# Patient Record
Sex: FEMALE | Race: WHITE | Employment: UNEMPLOYED | URBAN - METROPOLITAN AREA
[De-identification: names, ages, dates, MRNs, and addresses within clinical notes are randomized per-mention and may not be internally consistent; named-entity substitution may affect disease eponyms.]

---

## 2017-10-03 ENCOUNTER — HOSPITAL ENCOUNTER (OUTPATIENT)
Dept: WOMENS IMAGING | Age: 82
Discharge: HOME | End: 2017-10-03
Attending: INTERNAL MEDICINE | Admitting: INTERNAL MEDICINE

## 2017-10-03 DIAGNOSIS — Z12.31 VISIT FOR SCREENING MAMMOGRAM: ICD-10-CM

## 2017-12-14 PROBLEM — I50.43 CHF (CONGESTIVE HEART FAILURE), NYHA CLASS I, ACUTE ON CHRONIC, COMBINED (HCC): Status: ACTIVE | Noted: 2017-12-14

## 2018-06-03 PROBLEM — N39.0 UTI (URINARY TRACT INFECTION): Status: ACTIVE | Noted: 2018-06-03

## 2018-06-04 PROBLEM — F29 PSYCHOSIS (HCC): Status: ACTIVE | Noted: 2018-06-04

## 2018-07-04 PROBLEM — N39.0 UTI (URINARY TRACT INFECTION): Status: RESOLVED | Noted: 2018-06-03 | Resolved: 2018-07-04

## 2018-08-19 PROBLEM — N39.0 URINARY TRACT INFECTION: Status: ACTIVE | Noted: 2018-08-19

## 2018-09-18 PROBLEM — N39.0 URINARY TRACT INFECTION: Status: RESOLVED | Noted: 2018-08-19 | Resolved: 2018-09-18

## 2018-12-12 ENCOUNTER — HOSPITAL ENCOUNTER (INPATIENT)
Age: 83
LOS: 2 days | Discharge: HOME | DRG: 689 | End: 2018-12-14
Attending: EMERGENCY MEDICINE | Admitting: INTERNAL MEDICINE
Payer: MEDICARE

## 2018-12-12 ENCOUNTER — APPOINTMENT (OUTPATIENT)
Dept: GENERAL RADIOLOGY | Age: 83
DRG: 689 | End: 2018-12-12
Payer: MEDICARE

## 2018-12-12 ENCOUNTER — APPOINTMENT (OUTPATIENT)
Dept: CT IMAGING | Age: 83
DRG: 689 | End: 2018-12-12
Payer: MEDICARE

## 2018-12-12 DIAGNOSIS — S00.93XA CONTUSION OF HEAD, UNSPECIFIED PART OF HEAD, INITIAL ENCOUNTER: ICD-10-CM

## 2018-12-12 DIAGNOSIS — S80.02XA CONTUSION OF LEFT KNEE, INITIAL ENCOUNTER: Primary | ICD-10-CM

## 2018-12-12 DIAGNOSIS — N30.00 ACUTE CYSTITIS WITHOUT HEMATURIA: ICD-10-CM

## 2018-12-12 PROBLEM — R53.1 GENERALIZED WEAKNESS: Status: ACTIVE | Noted: 2018-12-12

## 2018-12-12 PROBLEM — N30.01 ACUTE CYSTITIS WITH HEMATURIA: Status: ACTIVE | Noted: 2018-12-12

## 2018-12-12 PROBLEM — G93.41 ACUTE METABOLIC ENCEPHALOPATHY: Status: ACTIVE | Noted: 2018-12-12

## 2018-12-12 LAB
A/G RATIO: 0.9 (ref 1.1–2.2)
ALBUMIN SERPL-MCNC: 3.9 G/DL (ref 3.4–5)
ALP BLD-CCNC: 99 U/L (ref 40–129)
ALT SERPL-CCNC: <5 U/L (ref 10–40)
ANION GAP SERPL CALCULATED.3IONS-SCNC: 14 MMOL/L (ref 3–16)
AST SERPL-CCNC: 10 U/L (ref 15–37)
BACTERIA: ABNORMAL /HPF
BASOPHILS ABSOLUTE: 0 K/UL (ref 0–0.2)
BASOPHILS RELATIVE PERCENT: 0.3 %
BILIRUB SERPL-MCNC: 0.3 MG/DL (ref 0–1)
BILIRUBIN URINE: NEGATIVE
BLOOD, URINE: ABNORMAL
BUN BLDV-MCNC: 22 MG/DL (ref 7–20)
CALCIUM SERPL-MCNC: 9.1 MG/DL (ref 8.3–10.6)
CHLORIDE BLD-SCNC: 99 MMOL/L (ref 99–110)
CLARITY: ABNORMAL
CO2: 22 MMOL/L (ref 21–32)
COLOR: YELLOW
CREAT SERPL-MCNC: 0.7 MG/DL (ref 0.6–1.2)
EOSINOPHILS ABSOLUTE: 0 K/UL (ref 0–0.6)
EOSINOPHILS RELATIVE PERCENT: 0 %
EPITHELIAL CELLS, UA: 0 /HPF (ref 0–5)
GFR AFRICAN AMERICAN: >60
GFR NON-AFRICAN AMERICAN: >60
GLOBULIN: 4.5 G/DL
GLUCOSE BLD-MCNC: 127 MG/DL (ref 70–99)
GLUCOSE URINE: NEGATIVE MG/DL
HCT VFR BLD CALC: 30.9 % (ref 36–48)
HEMOGLOBIN: 9.9 G/DL (ref 12–16)
HYALINE CASTS: 2 /LPF (ref 0–8)
KETONES, URINE: NEGATIVE MG/DL
LEUKOCYTE ESTERASE, URINE: ABNORMAL
LYMPHOCYTES ABSOLUTE: 1 K/UL (ref 1–5.1)
LYMPHOCYTES RELATIVE PERCENT: 12.7 %
MCH RBC QN AUTO: 24.9 PG (ref 26–34)
MCHC RBC AUTO-ENTMCNC: 32.1 G/DL (ref 31–36)
MCV RBC AUTO: 77.5 FL (ref 80–100)
MICROSCOPIC EXAMINATION: YES
MONOCYTES ABSOLUTE: 1 K/UL (ref 0–1.3)
MONOCYTES RELATIVE PERCENT: 12.7 %
NEUTROPHILS ABSOLUTE: 5.9 K/UL (ref 1.7–7.7)
NEUTROPHILS RELATIVE PERCENT: 74.3 %
NITRITE, URINE: POSITIVE
PDW BLD-RTO: 15.6 % (ref 12.4–15.4)
PH UA: 5
PLATELET # BLD: 333 K/UL (ref 135–450)
PMV BLD AUTO: 6.7 FL (ref 5–10.5)
POTASSIUM REFLEX MAGNESIUM: 4.3 MMOL/L (ref 3.5–5.1)
PROTEIN UA: NEGATIVE MG/DL
RBC # BLD: 3.99 M/UL (ref 4–5.2)
RBC UA: 1 /HPF (ref 0–4)
SODIUM BLD-SCNC: 135 MMOL/L (ref 136–145)
SPECIFIC GRAVITY UA: 1.01
TOTAL PROTEIN: 8.4 G/DL (ref 6.4–8.2)
TROPONIN: <0.01 NG/ML
URINE REFLEX TO CULTURE: YES
URINE TYPE: ABNORMAL
UROBILINOGEN, URINE: 0.2 E.U./DL
WBC # BLD: 8 K/UL (ref 4–11)
WBC UA: 78 /HPF (ref 0–5)

## 2018-12-12 PROCEDURE — 84484 ASSAY OF TROPONIN QUANT: CPT

## 2018-12-12 PROCEDURE — 93010 ELECTROCARDIOGRAM REPORT: CPT | Performed by: INTERNAL MEDICINE

## 2018-12-12 PROCEDURE — 2580000003 HC RX 258: Performed by: EMERGENCY MEDICINE

## 2018-12-12 PROCEDURE — 6360000002 HC RX W HCPCS: Performed by: EMERGENCY MEDICINE

## 2018-12-12 PROCEDURE — 87186 SC STD MICRODIL/AGAR DIL: CPT

## 2018-12-12 PROCEDURE — 6370000000 HC RX 637 (ALT 250 FOR IP): Performed by: INTERNAL MEDICINE

## 2018-12-12 PROCEDURE — 99285 EMERGENCY DEPT VISIT HI MDM: CPT

## 2018-12-12 PROCEDURE — 72170 X-RAY EXAM OF PELVIS: CPT

## 2018-12-12 PROCEDURE — 70450 CT HEAD/BRAIN W/O DYE: CPT

## 2018-12-12 PROCEDURE — 1200000000 HC SEMI PRIVATE

## 2018-12-12 PROCEDURE — 85025 COMPLETE CBC W/AUTO DIFF WBC: CPT

## 2018-12-12 PROCEDURE — 73560 X-RAY EXAM OF KNEE 1 OR 2: CPT

## 2018-12-12 PROCEDURE — 80053 COMPREHEN METABOLIC PANEL: CPT

## 2018-12-12 PROCEDURE — 81001 URINALYSIS AUTO W/SCOPE: CPT

## 2018-12-12 PROCEDURE — 87086 URINE CULTURE/COLONY COUNT: CPT

## 2018-12-12 PROCEDURE — 96365 THER/PROPH/DIAG IV INF INIT: CPT

## 2018-12-12 PROCEDURE — 87077 CULTURE AEROBIC IDENTIFY: CPT

## 2018-12-12 PROCEDURE — 93005 ELECTROCARDIOGRAM TRACING: CPT | Performed by: EMERGENCY MEDICINE

## 2018-12-12 PROCEDURE — 71045 X-RAY EXAM CHEST 1 VIEW: CPT

## 2018-12-12 RX ORDER — SODIUM CHLORIDE 0.9 % (FLUSH) 0.9 %
10 SYRINGE (ML) INJECTION PRN
Status: DISCONTINUED | OUTPATIENT
Start: 2018-12-12 | End: 2018-12-14 | Stop reason: HOSPADM

## 2018-12-12 RX ORDER — DOCUSATE SODIUM 100 MG/1
100 CAPSULE, LIQUID FILLED ORAL 2 TIMES DAILY PRN
Status: DISCONTINUED | OUTPATIENT
Start: 2018-12-12 | End: 2018-12-14 | Stop reason: HOSPADM

## 2018-12-12 RX ORDER — CITALOPRAM 10 MG/1
10 TABLET ORAL DAILY
Status: DISCONTINUED | OUTPATIENT
Start: 2018-12-12 | End: 2018-12-14 | Stop reason: HOSPADM

## 2018-12-12 RX ORDER — ASPIRIN 81 MG/1
81 TABLET, CHEWABLE ORAL DAILY
Status: DISCONTINUED | OUTPATIENT
Start: 2018-12-12 | End: 2018-12-14 | Stop reason: HOSPADM

## 2018-12-12 RX ORDER — FAMOTIDINE 20 MG/1
20 TABLET, FILM COATED ORAL 2 TIMES DAILY
Status: DISCONTINUED | OUTPATIENT
Start: 2018-12-12 | End: 2018-12-14 | Stop reason: HOSPADM

## 2018-12-12 RX ORDER — SODIUM CHLORIDE 0.9 % (FLUSH) 0.9 %
10 SYRINGE (ML) INJECTION EVERY 12 HOURS SCHEDULED
Status: DISCONTINUED | OUTPATIENT
Start: 2018-12-12 | End: 2018-12-14 | Stop reason: HOSPADM

## 2018-12-12 RX ORDER — ONDANSETRON 2 MG/ML
4 INJECTION INTRAMUSCULAR; INTRAVENOUS EVERY 4 HOURS PRN
Status: DISCONTINUED | OUTPATIENT
Start: 2018-12-12 | End: 2018-12-14 | Stop reason: HOSPADM

## 2018-12-12 RX ORDER — ACETAMINOPHEN 325 MG/1
650 TABLET ORAL EVERY 6 HOURS PRN
Status: DISCONTINUED | OUTPATIENT
Start: 2018-12-12 | End: 2018-12-14 | Stop reason: HOSPADM

## 2018-12-12 RX ORDER — BISACODYL 10 MG
10 SUPPOSITORY, RECTAL RECTAL DAILY PRN
Status: DISCONTINUED | OUTPATIENT
Start: 2018-12-12 | End: 2018-12-14 | Stop reason: HOSPADM

## 2018-12-12 RX ORDER — CARVEDILOL 12.5 MG/1
12.5 TABLET ORAL 2 TIMES DAILY WITH MEALS
Status: DISCONTINUED | OUTPATIENT
Start: 2018-12-12 | End: 2018-12-14 | Stop reason: HOSPADM

## 2018-12-12 RX ADMIN — CARVEDILOL 12.5 MG: 12.5 TABLET, FILM COATED ORAL at 21:06

## 2018-12-12 RX ADMIN — FAMOTIDINE 20 MG: 20 TABLET ORAL at 21:06

## 2018-12-12 RX ADMIN — ACETAMINOPHEN 650 MG: 325 TABLET, FILM COATED ORAL at 21:06

## 2018-12-12 RX ADMIN — CEFTRIAXONE 1 G: 1 INJECTION, POWDER, FOR SOLUTION INTRAMUSCULAR; INTRAVENOUS at 18:46

## 2018-12-12 ASSESSMENT — PAIN SCALES - GENERAL: PAINLEVEL_OUTOF10: 4

## 2018-12-12 NOTE — ED NOTES
Went into check on pt. Pt had removed all monitor leads and IV> pressure held to IV site and bandage applied.       Daljit Krueger RN  12/12/18 6931

## 2018-12-12 NOTE — H&P
No radiographic evidence of acute fracture. Ct Head Wo Contrast    Result Date: 12/12/2018  EXAMINATION: CT OF THE HEAD WITHOUT CONTRAST  12/12/2018 5:42 pm TECHNIQUE: CT of the head was performed without the administration of intravenous contrast. Dose modulation, iterative reconstruction, and/or weight based adjustment of the mA/kV was utilized to reduce the radiation dose to as low as reasonably achievable. COMPARISON: CT 06/03/2018 HISTORY: ORDERING SYSTEM PROVIDED HISTORY: fall TECHNOLOGIST PROVIDED HISTORY: Has a \"code stroke\" or \"stroke alert\" been called? ->No Ordering Physician Provided Reason for Exam: fall, ams. pt cannot cooperate Acuity: Acute Type of Exam: Initial FINDINGS: BRAIN/VENTRICLES: There is no acute intracranial hemorrhage, mass effect or midline shift. No abnormal extra-axial fluid collection. The gray-white differentiation is maintained without evidence of an acute infarct. There is no evidence of hydrocephalus. Moderate periventricular white matter hypoattenuation, likely small vessel ischemic disease. Mild atrophic changes. ORBITS: The visualized portion of the orbits demonstrate no acute abnormality. SINUSES: The visualized paranasal sinuses and mastoid air cells demonstrate no acute abnormality. SOFT TISSUES/SKULL:  No acute abnormality of the visualized skull or soft tissues. No acute intracranial abnormality. Xr Chest Portable    Result Date: 12/12/2018  EXAMINATION: SINGLE XRAY VIEW OF THE CHEST 12/12/2018 3:11 pm COMPARISON: Chest 08/19/2018 HISTORY: ORDERING SYSTEM PROVIDED HISTORY: Fall TECHNOLOGIST PROVIDED HISTORY: Reason for exam:->Fall Ordering Physician Provided Reason for Exam: pain left leg Acuity: Chronic Type of Exam: Ongoing FINDINGS: The cardiac silhouette is enlarged. Calcifications involving the aorta reflect atherosclerosis. The mediastinal and hilar silhouettes appear unremarkable. Senescent pulmonary changes. No consolidation or pleural effusion. Right lung apex is obscured by the patient's overlying chin. No pneumothorax is seen. No acute osseous abnormality is identified. Unchanged pacemaker. 1. Senescent pulmonary changes. 2. Calcific atherosclerosis aorta. 3. Cardiomegaly. If clinically there is concern of underlying rib fracture, sternal or other acute traumatic abnormality of the chest, then additional evaluation with dedicated imaging of the area of interest versus CT chest should be considered. EKG:   Read by ER in the absence of a Cardiologist shows      Assessment:   Principal Problem:    Acute cystitis with hematuria  Active Problems:    Essential hypertension    Dementia    Generalized weakness    Acute metabolic encephalopathy  Resolved Problems:    * No resolved hospital problems. *      Plan:       Acute cystitis with hematuria - patient was started on Rocephin empirically. Urine culture and sensitivities have been ordered, and antibiotic therapy will be adjusted as necessary based upon those results. Acute metabolic encephalopathy in the setting of Dementia - Likley due to UTI. Pt is on a number of medications which can cause or worsen confusion. Will hold those for now    Generalized weakness - Will ask PT/OT to evaluate and treat patient, and if necessary to provide recommendations for post hospital therapy    Essential (primary) hypertension - not on meds; monitor blood pressure. DVT Prophylaxis: Lovenox   Diet: General  Code Status: Full  (Advanced care planning has been discussed with patient and/or responsible family member and is reflected in the code status. Further orders associated with this have been entered if appropriate)    Disposition: Anticipate that patient will remain in the hospital for 2 to 3 days depending on further evaluation and clinical course.      Lo Boyer MD

## 2018-12-12 NOTE — ED PROVIDER NOTES
[facility]  eMERGENCY dEPARTMENT eNCOUnter        Pt Name: Maximino Nyhan  MRN: [id number]  Jerilyngfabigail 1935  Date of evaluation: 12/12/2018  Provider: David Cedeño MD  PCP: Angie Sanchez MD      [address] [place] [address]       Chief Complaint   Patient presents with    Fall       HISTORY [place]   (Location/Symptom, Timing/Onset, Context/Setting, Quality, Duration, Modifying Factors,Severity)  Note limiting factors. Maximino Nyhan is a 80 y.o. female       Location/Symptom: Fall  Timing/Onset: Patient does come to us by ambulance from home. Apparently fell last night and again this morning. This morning he was in the shower. Context/Setting: Patient has no particular complaints but she does have some underlying dementia. Quality: She is declining any pain or any symptoms  Duration: As above this appeared to start last night  Modifying Factors: None known  Severity: 0    Nursing Noteswere all reviewed and agreed with or any disagreements were addressed  in the HPI.     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

## 2018-12-13 PROBLEM — E43 SEVERE MALNUTRITION (HCC): Chronic | Status: ACTIVE | Noted: 2018-12-13

## 2018-12-13 LAB
ANION GAP SERPL CALCULATED.3IONS-SCNC: 16 MMOL/L (ref 3–16)
BASOPHILS ABSOLUTE: 0.1 K/UL (ref 0–0.2)
BASOPHILS RELATIVE PERCENT: 0.8 %
BUN BLDV-MCNC: 19 MG/DL (ref 7–20)
CALCIUM SERPL-MCNC: 8.6 MG/DL (ref 8.3–10.6)
CHLORIDE BLD-SCNC: 99 MMOL/L (ref 99–110)
CO2: 15 MMOL/L (ref 21–32)
CREAT SERPL-MCNC: 0.7 MG/DL (ref 0.6–1.2)
EKG ATRIAL RATE: 76 BPM
EKG DIAGNOSIS: NORMAL
EKG P AXIS: 72 DEGREES
EKG P-R INTERVAL: 152 MS
EKG Q-T INTERVAL: 388 MS
EKG QRS DURATION: 130 MS
EKG QTC CALCULATION (BAZETT): 436 MS
EKG R AXIS: -55 DEGREES
EKG T AXIS: 115 DEGREES
EKG VENTRICULAR RATE: 76 BPM
EOSINOPHILS ABSOLUTE: 0 K/UL (ref 0–0.6)
EOSINOPHILS RELATIVE PERCENT: 0.4 %
GFR AFRICAN AMERICAN: >60
GFR NON-AFRICAN AMERICAN: >60
GLUCOSE BLD-MCNC: 100 MG/DL (ref 70–99)
HCT VFR BLD CALC: 29.1 % (ref 36–48)
HEMOGLOBIN: 9.4 G/DL (ref 12–16)
LYMPHOCYTES ABSOLUTE: 1.1 K/UL (ref 1–5.1)
LYMPHOCYTES RELATIVE PERCENT: 17.1 %
MCH RBC QN AUTO: 25.2 PG (ref 26–34)
MCHC RBC AUTO-ENTMCNC: 32.4 G/DL (ref 31–36)
MCV RBC AUTO: 77.8 FL (ref 80–100)
MONOCYTES ABSOLUTE: 0.7 K/UL (ref 0–1.3)
MONOCYTES RELATIVE PERCENT: 11.9 %
NEUTROPHILS ABSOLUTE: 4.4 K/UL (ref 1.7–7.7)
NEUTROPHILS RELATIVE PERCENT: 69.8 %
PDW BLD-RTO: 15.7 % (ref 12.4–15.4)
PLATELET # BLD: 317 K/UL (ref 135–450)
PMV BLD AUTO: 6.7 FL (ref 5–10.5)
POTASSIUM REFLEX MAGNESIUM: 4.4 MMOL/L (ref 3.5–5.1)
RBC # BLD: 3.73 M/UL (ref 4–5.2)
REASON FOR REJECTION: NORMAL
REJECTED TEST: NORMAL
SODIUM BLD-SCNC: 130 MMOL/L (ref 136–145)
WBC # BLD: 6.3 K/UL (ref 4–11)

## 2018-12-13 PROCEDURE — G8978 MOBILITY CURRENT STATUS: HCPCS

## 2018-12-13 PROCEDURE — 6370000000 HC RX 637 (ALT 250 FOR IP): Performed by: INTERNAL MEDICINE

## 2018-12-13 PROCEDURE — 2580000003 HC RX 258: Performed by: INTERNAL MEDICINE

## 2018-12-13 PROCEDURE — 97163 PT EVAL HIGH COMPLEX 45 MIN: CPT

## 2018-12-13 PROCEDURE — 97530 THERAPEUTIC ACTIVITIES: CPT

## 2018-12-13 PROCEDURE — G8988 SELF CARE GOAL STATUS: HCPCS

## 2018-12-13 PROCEDURE — 97535 SELF CARE MNGMENT TRAINING: CPT

## 2018-12-13 PROCEDURE — G8987 SELF CARE CURRENT STATUS: HCPCS

## 2018-12-13 PROCEDURE — 97166 OT EVAL MOD COMPLEX 45 MIN: CPT

## 2018-12-13 PROCEDURE — 94760 N-INVAS EAR/PLS OXIMETRY 1: CPT

## 2018-12-13 PROCEDURE — 80048 BASIC METABOLIC PNL TOTAL CA: CPT

## 2018-12-13 PROCEDURE — 36415 COLL VENOUS BLD VENIPUNCTURE: CPT

## 2018-12-13 PROCEDURE — G8979 MOBILITY GOAL STATUS: HCPCS

## 2018-12-13 PROCEDURE — 85025 COMPLETE CBC W/AUTO DIFF WBC: CPT

## 2018-12-13 PROCEDURE — 93971 EXTREMITY STUDY: CPT

## 2018-12-13 PROCEDURE — 6360000002 HC RX W HCPCS: Performed by: INTERNAL MEDICINE

## 2018-12-13 PROCEDURE — 1200000000 HC SEMI PRIVATE

## 2018-12-13 RX ORDER — ALPRAZOLAM 0.5 MG/1
0.5 TABLET ORAL NIGHTLY
Status: ON HOLD | COMMUNITY
End: 2018-12-25

## 2018-12-13 RX ORDER — POTASSIUM CHLORIDE 20MEQ/15ML
20 LIQUID (ML) ORAL DAILY
COMMUNITY

## 2018-12-13 RX ORDER — ACETAMINOPHEN AND CODEINE PHOSPHATE 60; 300 MG/1; MG/1
1 TABLET ORAL 2 TIMES DAILY PRN
COMMUNITY

## 2018-12-13 RX ADMIN — CITALOPRAM HYDROBROMIDE 10 MG: 10 TABLET ORAL at 08:15

## 2018-12-13 RX ADMIN — ACETAMINOPHEN 650 MG: 325 TABLET, FILM COATED ORAL at 08:23

## 2018-12-13 RX ADMIN — CARVEDILOL 12.5 MG: 12.5 TABLET, FILM COATED ORAL at 08:15

## 2018-12-13 RX ADMIN — Medication 10 ML: at 20:23

## 2018-12-13 RX ADMIN — ASPIRIN 81 MG 81 MG: 81 TABLET ORAL at 08:15

## 2018-12-13 RX ADMIN — ENOXAPARIN SODIUM 40 MG: 40 INJECTION SUBCUTANEOUS at 08:15

## 2018-12-13 RX ADMIN — FAMOTIDINE 20 MG: 20 TABLET ORAL at 20:22

## 2018-12-13 RX ADMIN — ACETAMINOPHEN 650 MG: 325 TABLET, FILM COATED ORAL at 17:38

## 2018-12-13 RX ADMIN — FAMOTIDINE 20 MG: 20 TABLET ORAL at 08:15

## 2018-12-13 RX ADMIN — CARVEDILOL 12.5 MG: 12.5 TABLET, FILM COATED ORAL at 17:38

## 2018-12-13 RX ADMIN — Medication 10 ML: at 08:16

## 2018-12-13 RX ADMIN — CEFTRIAXONE 1 G: 1 INJECTION, POWDER, FOR SOLUTION INTRAMUSCULAR; INTRAVENOUS at 08:16

## 2018-12-13 ASSESSMENT — PAIN SCALES - GENERAL
PAINLEVEL_OUTOF10: 3
PAINLEVEL_OUTOF10: 5
PAINLEVEL_OUTOF10: 1
PAINLEVEL_OUTOF10: 3
PAINLEVEL_OUTOF10: 3

## 2018-12-13 NOTE — PROGRESS NOTES
Nutrition Assessment    Type and Reason for Visit: Initial (Low BMI)    Nutrition Recommendations:   Continue General Diet  Add Ensure Enlive BID, and Magic Cup with Dinner  Continue to monitor PO intake, weights, nutrition related lab values and skin integrity. Nutrition Assessment: Pt severely malnourished on admission AEB low BMI, severe muscle/fat wasting and low po intake. Pt at risk for further nutrition compromise related to loss of appetite. Will send [employer] and [employer]. Malnutrition Assessment:  · Malnutrition Status: Meets the criteria for severe malnutrition  · Context: Acute illness or injury  · Findings of the 6 clinical characteristics of malnutrition (Minimum of 2 out of 6 clinical characteristics is required to make the diagnosis of moderate or severe Protein Calorie Malnutrition based on AND/ASPEN Guidelines):  1. Energy Intake- (Unsure of time, pt was sleepy during assessment ), not able to assess    2. Weight Loss-No significant weight loss, unable to assess  3. Fat Loss-Severe subcutaneous fat loss, Orbital  4. Muscle Loss-Severe muscle mass loss, Temples (temporalis muscle), Clavicles (pectoralis and deltoids)  5. Fluid Accumulation-No significant fluid accumulation,    6.   Strength-Not measured    Nutrition Risk Level: High    Nutrient Needs:  · Estimated Daily Total Kcal: 8665-1347 kcal/day (based on 25-30 kcal/kg)  · Estimated Daily Protein (g): 45-54 gm protein/day (based on 1-1.2 gm/kg)  · Estimated Daily Total Fluid (ml/day): 0761-4606 kcal/day (based on 25-30 kcal/kg)    Nutrition Diagnosis:   · Problem: Severe malnutrition  · Etiology: related to Insufficient energy/nutrient consumption     Signs and symptoms:  as evidenced by Diet history of poor intake, BMI, Severe muscle loss, Severe loss of subcutaneous fat    Objective Information:  · Nutrition-Focused Physical Findings: Visible signs of malnutrition  · Wound Type: None  · Current Nutrition Therapies:  · Oral Diet Orders: General   · Oral Diet intake: 0%  · Anthropometric Measures:  · Ht: 5' (152.4 cm)   · Current Body Wt: 94 lb (42.6 kg)  · Admission Body Wt: 96 lb (43.5 kg)  · Usual Body Wt:  (Pt reports 94 lbs sounds like her UBW)  · Ideal Body Wt: 100 lb (45.4 kg), % Ideal Body 94%  · BMI Classification: BMI 18.5 - 24.9 Normal Weight    Nutrition Interventions:   Continue current diet, Start ONS  Continued Inpatient Monitoring, Education Not Indicated    Nutrition Evaluation:   · Evaluation: Goals set   · Goals: Consume at least 50% of all meals and supplements provided during this hospital stay    · Monitoring: Meal Intake, Supplement Intake, Skin Integrity, I&O, Weight, Mental Status/Confusion, Monitor Bowel Function      Electronically signed by Shannon Moses RD, LD on 12/13/18 at 2:45 PM    Contact Number: [phone]

## 2018-12-13 NOTE — PROGRESS NOTES
Family / Caregiver Present: No  Referring Practitioner: Mario Alberto Brandt  Diagnosis: UTI, Acute metabolic encephalopathy in the setting of Dementia, falls/generalized weakness  Subjective  Subjective: Pt met b/s for OT eval/cotx with PT. Pt sidelying on L side in fetal position on arrival, agreeable to participate in therapy. Pt pleasantly confused. Pt c/o sig pain L knee, but unable to rate d/t cognitive deficits. Pain Assessment  Patient Currently in Pain: Yes (unrated left knee pain)    Social/Functional History  Social/Functional History  Lives With: Alone (dgt lives in same condo complex and is with the pt for multiple hours in the day; pt alone at night)  Type of Home:  ([facility])  Home Layout: One level  Home Access: Level entry  Bathroom Shower/Tub: Walk-in shower  Bathroom Toilet: Standard  Bathroom Equipment: Built-in shower seat, Grab bars in shower, Grab bars around toilet  Bathroom Accessibility: Walker accessible  Home Equipment: [address], Rolling walker  [address] Help From: Family  ADL Assistance: Needs assistance  Homemaking Assistance: Needs assistance  Homemaking Responsibilities: No  Ambulation Assistance: Independent (with RW)  Transfer Assistance: Independent  Active : No  Patient's  Info: dgt  Mode of Transportation: Family  Additional Comments: dgt with pt at least 6hrs/day; has video cameras in the pt's condo for constant monitoring. Above info obtained per EMR from previous therapy encounters as pt is a poor historian. Objective   Vision: Within Functional Limits  Vision Exceptions: Wears glasses for reading  Hearing: Within functional limits      Orientation  Overall Orientation Status: Impaired  Orientation Level: Oriented to person;Disoriented to place; Disoriented to time;Disoriented to situation (knew name, not birthday or age)     Observation/Palpation  Observation: pt left sidelying in bed with brief soiled by urine and some stool.  Pt guarding her left knee

## 2018-12-13 NOTE — PROGRESS NOTES
Therapy Time    Individual Concurrent Group Co-treatment   Time In 0967            Time Out 0945          Minutes 46             Timed Code Treatment Minutes: [address], PT

## 2018-12-13 NOTE — ED NOTES
Fall risk precautions in place. Bed in lowest position with wheels locked, non-skid socks on pt, fall risk ID on pt, call light in reach, pt encouraged to call before getting out of bed and for any other needs or complaints.       Hesham Patterson RN  12/12/18 7948

## 2018-12-14 ENCOUNTER — APPOINTMENT (OUTPATIENT)
Dept: GENERAL RADIOLOGY | Age: 83
DRG: 689 | End: 2018-12-14
Payer: MEDICARE

## 2018-12-14 VITALS
SYSTOLIC BLOOD PRESSURE: 102 MMHG | DIASTOLIC BLOOD PRESSURE: 67 MMHG | WEIGHT: 92.15 LBS | HEART RATE: 91 BPM | BODY MASS INDEX: 18.09 KG/M2 | TEMPERATURE: 97.4 F | HEIGHT: 60 IN | RESPIRATION RATE: 18 BRPM | OXYGEN SATURATION: 95 %

## 2018-12-14 LAB
ANION GAP SERPL CALCULATED.3IONS-SCNC: 17 MMOL/L (ref 3–16)
BASOPHILS ABSOLUTE: 0 K/UL (ref 0–0.2)
BASOPHILS RELATIVE PERCENT: 0.5 %
BUN BLDV-MCNC: 20 MG/DL (ref 7–20)
CALCIUM SERPL-MCNC: 8.8 MG/DL (ref 8.3–10.6)
CHLORIDE BLD-SCNC: 101 MMOL/L (ref 99–110)
CO2: 16 MMOL/L (ref 21–32)
CREAT SERPL-MCNC: 0.8 MG/DL (ref 0.6–1.2)
EOSINOPHILS ABSOLUTE: 0.1 K/UL (ref 0–0.6)
EOSINOPHILS RELATIVE PERCENT: 1.6 %
GFR AFRICAN AMERICAN: >60
GFR NON-AFRICAN AMERICAN: >60
GLUCOSE BLD-MCNC: 118 MG/DL (ref 70–99)
HCT VFR BLD CALC: 35.6 % (ref 36–48)
HEMOGLOBIN: 11.1 G/DL (ref 12–16)
LYMPHOCYTES ABSOLUTE: 2 K/UL (ref 1–5.1)
LYMPHOCYTES RELATIVE PERCENT: 25.3 %
MCH RBC QN AUTO: 24.9 PG (ref 26–34)
MCHC RBC AUTO-ENTMCNC: 31.2 G/DL (ref 31–36)
MCV RBC AUTO: 79.8 FL (ref 80–100)
MONOCYTES ABSOLUTE: 0.7 K/UL (ref 0–1.3)
MONOCYTES RELATIVE PERCENT: 9 %
NEUTROPHILS ABSOLUTE: 5 K/UL (ref 1.7–7.7)
NEUTROPHILS RELATIVE PERCENT: 63.6 %
ORGANISM: ABNORMAL
PDW BLD-RTO: 16.2 % (ref 12.4–15.4)
PLATELET # BLD: 372 K/UL (ref 135–450)
PMV BLD AUTO: 6.9 FL (ref 5–10.5)
POTASSIUM REFLEX MAGNESIUM: 3.8 MMOL/L (ref 3.5–5.1)
RBC # BLD: 4.46 M/UL (ref 4–5.2)
SODIUM BLD-SCNC: 134 MMOL/L (ref 136–145)
URINE CULTURE, ROUTINE: ABNORMAL
URINE CULTURE, ROUTINE: ABNORMAL
WBC # BLD: 7.9 K/UL (ref 4–11)

## 2018-12-14 PROCEDURE — 6370000000 HC RX 637 (ALT 250 FOR IP): Performed by: INTERNAL MEDICINE

## 2018-12-14 PROCEDURE — 73562 X-RAY EXAM OF KNEE 3: CPT

## 2018-12-14 PROCEDURE — 97530 THERAPEUTIC ACTIVITIES: CPT

## 2018-12-14 PROCEDURE — 6370000000 HC RX 637 (ALT 250 FOR IP): Performed by: HOSPITALIST

## 2018-12-14 PROCEDURE — 36415 COLL VENOUS BLD VENIPUNCTURE: CPT

## 2018-12-14 PROCEDURE — 94760 N-INVAS EAR/PLS OXIMETRY 1: CPT

## 2018-12-14 PROCEDURE — 85025 COMPLETE CBC W/AUTO DIFF WBC: CPT

## 2018-12-14 PROCEDURE — 6360000002 HC RX W HCPCS: Performed by: INTERNAL MEDICINE

## 2018-12-14 PROCEDURE — 97535 SELF CARE MNGMENT TRAINING: CPT

## 2018-12-14 PROCEDURE — G8978 MOBILITY CURRENT STATUS: HCPCS

## 2018-12-14 PROCEDURE — 80048 BASIC METABOLIC PNL TOTAL CA: CPT

## 2018-12-14 PROCEDURE — G8979 MOBILITY GOAL STATUS: HCPCS

## 2018-12-14 PROCEDURE — 99222 1ST HOSP IP/OBS MODERATE 55: CPT | Performed by: ORTHOPAEDIC SURGERY

## 2018-12-14 RX ORDER — CIPROFLOXACIN 500 MG/1
500 TABLET, FILM COATED ORAL EVERY 12 HOURS SCHEDULED
Status: DISCONTINUED | OUTPATIENT
Start: 2018-12-14 | End: 2018-12-14 | Stop reason: HOSPADM

## 2018-12-14 RX ORDER — CIPROFLOXACIN 500 MG/1
500 TABLET, FILM COATED ORAL 2 TIMES DAILY
Qty: 10 TABLET | Refills: 0 | Status: SHIPPED | OUTPATIENT
Start: 2018-12-14 | End: 2018-12-19

## 2018-12-14 RX ADMIN — CIPROFLOXACIN HYDROCHLORIDE 500 MG: 500 TABLET, FILM COATED ORAL at 12:15

## 2018-12-14 RX ADMIN — ASPIRIN 81 MG 81 MG: 81 TABLET ORAL at 09:43

## 2018-12-14 RX ADMIN — ACETAMINOPHEN 650 MG: 325 TABLET, FILM COATED ORAL at 03:03

## 2018-12-14 RX ADMIN — ENOXAPARIN SODIUM 40 MG: 40 INJECTION SUBCUTANEOUS at 09:48

## 2018-12-14 RX ADMIN — CARVEDILOL 12.5 MG: 12.5 TABLET, FILM COATED ORAL at 09:38

## 2018-12-14 RX ADMIN — CITALOPRAM HYDROBROMIDE 10 MG: 10 TABLET ORAL at 09:43

## 2018-12-14 RX ADMIN — FAMOTIDINE 20 MG: 20 TABLET ORAL at 09:43

## 2018-12-14 ASSESSMENT — PAIN SCALES - GENERAL
PAINLEVEL_OUTOF10: 0
PAINLEVEL_OUTOF10: 4
PAINLEVEL_OUTOF10: 0

## 2018-12-14 NOTE — PROGRESS NOTES
Pt pulled out her IV, two RN attempted to put new one in with out successes. Hospitalist notified. will continue to monitor.

## 2018-12-14 NOTE — CONSULTS
Left Lower Extremity:  normal        DATA:    CBC:   Lab Results   Component Value Date    WBC 7.9 12/14/2018    RBC 4.46 12/14/2018    HGB 11.1 12/14/2018    HCT 35.6 12/14/2018    MCV 79.8 12/14/2018    MCH 24.9 12/14/2018    MCHC 31.2 12/14/2018    RDW 16.2 12/14/2018     12/14/2018    MPV 6.9 12/14/2018     WBC:    Lab Results   Component Value Date    WBC 7.9 12/14/2018     PT/INR:    Lab Results   Component Value Date    PROTIME 12.8 02/01/2017    INR 1.12 02/01/2017     PTT:    Lab Results   Component Value Date    APTT 32.8 09/13/2015   [APTT    IMAGING: A standing PA view of both knees, lateral view, and sunrise views of the left knee was obtained today, and reviewed. These demonstrate bilateral TKA, no acute bony abnormality with the joint spaces well preserved. IMPRESSION: Left knee pain, no fracture. PLAN:  - Xray 3 views left knee, reviewed, no fracture. - WBAT  - F/U in Office in 1-2 weeks    Thank you very much for the kind consultation and allowing me to participate in this patient's care. I will continue to keep you apprised of her progress.            Jalyn Arnold MD   12/14/2018  1:19 PM

## 2018-12-14 NOTE — PROGRESS NOTES
UTILIZATION REVIEW     Ruby MANDUJANO  12/14/2018,   Suzanne Fernandez MD    1935  Chief Complaint   Patient presents with    Fall        Principal Problem:    Acute cystitis with hematuria  Active Problems:    Essential hypertension    Dementia    Generalized weakness    Acute metabolic encephalopathy    Severe malnutrition ([address]  Resolved Problems:    * No resolved hospital problems. *     has a past medical history of Dementia and Hypertension. Past Surgical History:     has a past surgical history that includes knee surgery; hip surgery; Hysterectomy; Cardiac surgery; and Colon surgery. ED REVIEW:  Kyra Crockett RN Registered Nurse Signed   ED Triage Notes Date of Service: 12/12/2018  3:06 PM         []Manual[]Template  []Copied  Pt arrived to the ED via EMS from home. Per EMS pt fell last yesterday but her daughter was able to get her up on her own. Per EMS pt fell in the shower today and daughter was able to get her up to the shower chair but unable to get her out. Pt has a hx of dementia, but has some increased confusion today. Pt is unsure of where she is or what is going on. She does not remember that she fell. Pt denies pain but when we toucher her left knee she says that hurts. Pt has skin tear to right elbow. CURRENT STATUS:  PCP: Shon Mares MD  Patient Name: Darlene Flores     Date of Service: Pt seen/examined on 12/12/2018 and Admitted to Inpatient with expected LOS greater than two midnights due to medical therapy     CHIEF COMPLAINT:  Pt c/o falls, confusion  HISTORY OF PRESENT ILLNESS: Patient is an 22-year-old woman with history of hypertension and dementia who presents to the emergency room for evaluation after she fell last night and again this morning. She denies any symptoms, however her family is concerned that she has been falling. In addition, they report that she has had confusion which is worse than her normal mental status in the setting of dementia. In the emergency room she was found to have a urinary tract infection. She is being admitted for further evaluation and treatment. Associated signs and symptoms do not include fever or chills, nausea, vomiting, abdominal pain, hemoptysis, hematochezia, diarrhea, constipation or urinary symptoms.            MY REVIEW:  FALL  X 2   Left knee issu   Prior surgery   Prior hip surgery bilaterally  Contractures  immobility  Oseoporosis  Confusion  Altered mental status  Hyponatremia 135 130 134  Anemia 9.9 9.4  IV FLUIDS  Cystitis   Klebsiella gram negative  Hematuria  IV antibiotics  Frailty     low BMI  ? Fibroid or pelvi ccalcification 2.4      WORKUP and treatment continues  The Utilization Review Committee members, including its physician members, have reviewed this case and agree that this patient does  meet evidence based criteria for inpatient services,  to ADMISSION =\"admit as inpatient\"  Medical care will continue to be provided by Elizabeth Mckeon MD, who concurs with this decision and has documented his/her concurrence in the patient's medical record. Truman Palmer MD, [place], Merged with [facility], [address] [place] [address]  Cardiac, Vascular & Thoracic Surgeon  [address] [place] [address]    Office [phone]   [phone]  [email or web address]    12/14/2018  10:36 AM

## 2018-12-14 NOTE — PROGRESS NOTES
Currently in Pain: Yes (unrated left leg pain )     Orientation  Orientation Level: Oriented to person;Disoriented to place; Disoriented to time;Disoriented to situation    Objective    ADL  Feeding: Beverage management;Stand by assistance (refuses to eat, did drink apple juice and majority of ensure with encouragement and cues)    Balance  Sitting Balance:  (initially required Mod A, improved to SBA )    Functional Mobility  Assist Level: Dependent/Total  Functional Mobility Comments: can only complete stand-pivots at this time with assist of 2 or is a maxi-move transfer     Bed mobility  Rolling to Left: Dependent/Total  Rolling to Right: Dependent/Total  Supine to Sit: Dependent/Total  Sit to Supine: Dependent/Total  Scooting: Stand by assistance (to scoot back on when sitting on bed and in the chair)    Transfers  Transfer Comments: total A x2 for bed-chair transfer, pt unable to bear weight through B LE's therefore was a dependent lift    Cognition  Overall Cognitive Status: Exceptions  Following Commands: Follows one step commands with increased time; Follows one step commands with repetition  Attention Span: Attends with cues to redirect  Memory: Decreased recall of recent events;Decreased short term memory;Decreased recall of biographical Information;Decreased recall of precautions;Decreased long term memory (h/o dementia )  Safety Judgement: Decreased awareness of need for safety;Decreased awareness of need for assistance  Problem Solving: Decreased awareness of errors;Assistance required to identify errors made;Assistance required to generate solutions;Assistance required to implement solutions;Assistance required to correct errors made  Insights: Decreased awareness of deficits  Initiation: Requires cues for all  Sequencing: Requires cues for all     Assessment   Performance deficits / Impairments: Decreased functional mobility ; Decreased ADL status; Decreased strength;Decreased safe awareness;Decreased

## 2018-12-14 NOTE — PROGRESS NOTES
Physical Therapy  Facility/Department: [address] MED SURG  Daily Treatment Note  Assessment: Diego Rosario is an 80 y.o. F admit 18 from home after two falls (contusion to head and knee) -- dx with UTI and metabolic encephalopathy. Prior to admit pt was living in a condo with her dtr living near-by; pt is presumed to have been ambulating short distances independently with a walker but unable to confirm this detail due to pt's dementia. Physical examination this date revealed that the patient is severely guarding her left knee into flexion and unable to straighten it to bear weight. She required Total Assist x2 person for a lift transfer from bed to the recliner chair; she was unable to bear weight on her left leg with attempts to stand and pivot. Pt is very fearful of pain in her knee. Recommend ortho consult to examine left knee. Pt appears to be functioning below her recent baselines. Recommend low intensity skilled PT 3-5x/wk at discharge to optimize patient's functional mobility. Will follow. Harley Crigler scored a  on the AM-PAC short mobility form. Current research shows that an AM-PAC score of 17 or less is typically not associated with a discharge to the patient's home setting. Based on the patients AM-PAC score and their current functional mobility deficits, it is recommended that the patient have 3-5 sessions per week of Physical Therapy at d/c to increase the patients independence. NAME: Harley Crigler  : 1935  MRN: [id number]    Date of Service: 2018    Discharge Recommendations:  Patient would benefit from continued therapy after discharge, 3-5 sessions per week        Patient Diagnosis(es): The primary encounter diagnosis was Contusion of left knee, initial encounter. Diagnoses of Acute cystitis without hematuria and Contusion of head, unspecified part of head, initial encounter were also pertinent to this visit.      has a past medical history of Dementia and improve to 75*), guarding hip into adduction posture, keeping hip flexed to ~110*      Assessment   Body structures, Functions, Activity limitations: Decreased functional mobility   Assessment: Elmira Chambers is an 80 y.o. F admit 12/12/18 from home after two falls (contusion to head and knee) -- dx with UTI and metabolic encephalopathy. Prior to admit pt was living in a condo with her dtr living near-by; pt is presumed to have been ambulating short distances independently with a walker but unable to confirm this detail due to pt's dementia. Physical examination this date revealed that the patient is severely guarding her left knee into flexion and unable to straighten it to bear weight. She required Total Assist x2 person for a lift transfer from bed to the recliner chair; she was unable to bear weight on her left leg with attempts to stand and pivot. Pt is very fearful of pain in her knee. Recommend ortho consult to examine left knee. Pt appears to be functioning below her recent baselines. Recommend low intensity skilled PT 3-5x/wk at discharge to optimize patient's functional mobility. Will follow. Treatment Diagnosis: Decreased activity tolerance, left knee pain, restricted left knee ROM, left leg weakness, inability to ambulate  Prognosis: Fair;Guarded  Patient Education: Role of PT. She verb no evidence of understanding. REQUIRES PT FOLLOW UP: Yes  Activity Tolerance  Activity Tolerance: Patient limited by pain; Patient limited by cognitive status     G-Code  PT G-Codes  Functional Assessment Tool Used: [facility] IP mobility  Score: 8  Functional Limitation: Mobility: Walking and moving around  Mobility: Walking and Moving Around Current Status (): At least 80 percent but less than 100 percent impaired, limited or restricted  Mobility: Walking and Moving Around Goal Status ():  At least 60 percent but less than 80 percent impaired, limited or restricted  OutComes Score

## 2018-12-14 NOTE — PROGRESS NOTES
Patient has discharge orders but has been seen by ortho surgeon for inability to put any weight or stretch left leg. Putting in orders for xray of left knee/ lower extremity per orthopedic surgeon's orders.

## 2018-12-15 ENCOUNTER — CARE COORDINATION (OUTPATIENT)
Dept: CASE MANAGEMENT | Age: 83
End: 2018-12-15

## 2018-12-15 DIAGNOSIS — E43 SEVERE MALNUTRITION (HCC): Primary | Chronic | ICD-10-CM

## 2018-12-15 PROCEDURE — 1111F DSCHRG MED/CURRENT MED MERGE: CPT

## 2018-12-15 NOTE — DISCHARGE SUMMARY
topiramate (TOPAMAX) 25 MG tablet  Take 1 tablet by mouth nightly             traZODone (DESYREL) 50 MG tablet  Take 50 mg by mouth nightly                    Time Spent on discharge is more than 30 min in the examination, evaluation, counseling and review of medications and discharge plan. Signed:  Larry Modi MD   12/15/2018      Thank you Jasmine Mcfarland MD for the opportunity to be involved in this patient's care. If you have any questions or concerns please feel free to contact me at [phone]. This note was transcribed using [id number] [employer]. Please disregard any translational errors.

## 2018-12-16 NOTE — CARE COORDINATION
incontinent and wears depends - she is urinating and her bowels are moving. Rosa M Moy denies her mother having any fever. Rosa M Moy states her mother is eating, drinking, and taking her meds. Rosa M Moy reviewed her mother's daily med list with writer ([id number] completed). Rosa M Moy denies any needs or concerns today. Hand off to [facility] Team for follow up calls. No future appointments.     Chica Gracia, RN

## 2018-12-20 ENCOUNTER — HOSPITAL ENCOUNTER (INPATIENT)
Age: 83
LOS: 5 days | Discharge: NURSING, REHAB OR LONG-TERM CARE | DRG: 683 | End: 2018-12-25
Attending: INTERNAL MEDICINE | Admitting: INTERNAL MEDICINE
Payer: MEDICARE

## 2018-12-20 ENCOUNTER — APPOINTMENT (OUTPATIENT)
Dept: GENERAL RADIOLOGY | Age: 83
DRG: 683 | End: 2018-12-20
Payer: MEDICARE

## 2018-12-20 DIAGNOSIS — R53.1 GENERAL WEAKNESS: ICD-10-CM

## 2018-12-20 DIAGNOSIS — R19.7 DIARRHEA, UNSPECIFIED TYPE: ICD-10-CM

## 2018-12-20 DIAGNOSIS — R62.7 FAILURE TO THRIVE IN ADULT: ICD-10-CM

## 2018-12-20 DIAGNOSIS — F03.91 DEMENTIA WITH BEHAVIORAL DISTURBANCE, UNSPECIFIED DEMENTIA TYPE: ICD-10-CM

## 2018-12-20 DIAGNOSIS — S72.002A CLOSED FRACTURE OF LEFT HIP, INITIAL ENCOUNTER (HCC): ICD-10-CM

## 2018-12-20 DIAGNOSIS — N17.9 AKI (ACUTE KIDNEY INJURY) (HCC): Primary | ICD-10-CM

## 2018-12-20 LAB
A/G RATIO: 0.8 (ref 1.1–2.2)
ALBUMIN SERPL-MCNC: 3.5 G/DL (ref 3.4–5)
ALP BLD-CCNC: 88 U/L (ref 40–129)
ALT SERPL-CCNC: <5 U/L (ref 10–40)
ANION GAP SERPL CALCULATED.3IONS-SCNC: 14 MMOL/L (ref 3–16)
AST SERPL-CCNC: 9 U/L (ref 15–37)
BASOPHILS ABSOLUTE: 0 K/UL (ref 0–0.2)
BASOPHILS RELATIVE PERCENT: 0.6 %
BILIRUB SERPL-MCNC: 0.4 MG/DL (ref 0–1)
BILIRUBIN URINE: NEGATIVE
BLOOD, URINE: NEGATIVE
BUN BLDV-MCNC: 18 MG/DL (ref 7–20)
CALCIUM SERPL-MCNC: 8.8 MG/DL (ref 8.3–10.6)
CHLORIDE BLD-SCNC: 101 MMOL/L (ref 99–110)
CLARITY: CLEAR
CO2: 23 MMOL/L (ref 21–32)
COLOR: YELLOW
CREAT SERPL-MCNC: 2.2 MG/DL (ref 0.6–1.2)
EOSINOPHILS ABSOLUTE: 0.3 K/UL (ref 0–0.6)
EOSINOPHILS RELATIVE PERCENT: 3.3 %
GFR AFRICAN AMERICAN: 26
GFR NON-AFRICAN AMERICAN: 21
GLOBULIN: 4.5 G/DL
GLUCOSE BLD-MCNC: 92 MG/DL (ref 70–99)
GLUCOSE URINE: NEGATIVE MG/DL
HCT VFR BLD CALC: 31.3 % (ref 36–48)
HEMOGLOBIN: 10.2 G/DL (ref 12–16)
KETONES, URINE: NEGATIVE MG/DL
LEUKOCYTE ESTERASE, URINE: NEGATIVE
LYMPHOCYTES ABSOLUTE: 1.3 K/UL (ref 1–5.1)
LYMPHOCYTES RELATIVE PERCENT: 15.7 %
MCH RBC QN AUTO: 25 PG (ref 26–34)
MCHC RBC AUTO-ENTMCNC: 32.5 G/DL (ref 31–36)
MCV RBC AUTO: 77.1 FL (ref 80–100)
MICROSCOPIC EXAMINATION: NORMAL
MONOCYTES ABSOLUTE: 0.9 K/UL (ref 0–1.3)
MONOCYTES RELATIVE PERCENT: 11.2 %
NEUTROPHILS ABSOLUTE: 5.7 K/UL (ref 1.7–7.7)
NEUTROPHILS RELATIVE PERCENT: 69.2 %
NITRITE, URINE: NEGATIVE
PDW BLD-RTO: 15.1 % (ref 12.4–15.4)
PH UA: 5
PLATELET # BLD: 544 K/UL (ref 135–450)
PMV BLD AUTO: 6.6 FL (ref 5–10.5)
POTASSIUM SERPL-SCNC: 4.6 MMOL/L (ref 3.5–5.1)
PROTEIN UA: NEGATIVE MG/DL
RBC # BLD: 4.06 M/UL (ref 4–5.2)
SODIUM BLD-SCNC: 138 MMOL/L (ref 136–145)
SPECIFIC GRAVITY UA: 1.01
TOTAL PROTEIN: 8 G/DL (ref 6.4–8.2)
TROPONIN: <0.01 NG/ML
URINE REFLEX TO CULTURE: NORMAL
URINE TYPE: NORMAL
UROBILINOGEN, URINE: 0.2 E.U./DL
WBC # BLD: 8.2 K/UL (ref 4–11)

## 2018-12-20 PROCEDURE — 81003 URINALYSIS AUTO W/O SCOPE: CPT

## 2018-12-20 PROCEDURE — 2580000003 HC RX 258: Performed by: NURSE PRACTITIONER

## 2018-12-20 PROCEDURE — 82570 ASSAY OF URINE CREATININE: CPT

## 2018-12-20 PROCEDURE — 84484 ASSAY OF TROPONIN QUANT: CPT

## 2018-12-20 PROCEDURE — 96360 HYDRATION IV INFUSION INIT: CPT

## 2018-12-20 PROCEDURE — 85025 COMPLETE CBC W/AUTO DIFF WBC: CPT

## 2018-12-20 PROCEDURE — 99285 EMERGENCY DEPT VISIT HI MDM: CPT

## 2018-12-20 PROCEDURE — 6370000000 HC RX 637 (ALT 250 FOR IP): Performed by: INTERNAL MEDICINE

## 2018-12-20 PROCEDURE — 93010 ELECTROCARDIOGRAM REPORT: CPT | Performed by: INTERNAL MEDICINE

## 2018-12-20 PROCEDURE — 2580000003 HC RX 258: Performed by: INTERNAL MEDICINE

## 2018-12-20 PROCEDURE — 1200000000 HC SEMI PRIVATE

## 2018-12-20 PROCEDURE — 71045 X-RAY EXAM CHEST 1 VIEW: CPT

## 2018-12-20 PROCEDURE — 80053 COMPREHEN METABOLIC PANEL: CPT

## 2018-12-20 PROCEDURE — 6360000002 HC RX W HCPCS: Performed by: INTERNAL MEDICINE

## 2018-12-20 PROCEDURE — 93005 ELECTROCARDIOGRAM TRACING: CPT | Performed by: NURSE PRACTITIONER

## 2018-12-20 PROCEDURE — 84300 ASSAY OF URINE SODIUM: CPT

## 2018-12-20 RX ORDER — SODIUM CHLORIDE 9 MG/ML
INJECTION, SOLUTION INTRAVENOUS CONTINUOUS
Status: DISCONTINUED | OUTPATIENT
Start: 2018-12-20 | End: 2018-12-23

## 2018-12-20 RX ORDER — HEPARIN SODIUM 5000 [USP'U]/ML
5000 INJECTION, SOLUTION INTRAVENOUS; SUBCUTANEOUS EVERY 8 HOURS SCHEDULED
Status: DISCONTINUED | OUTPATIENT
Start: 2018-12-20 | End: 2018-12-25 | Stop reason: HOSPADM

## 2018-12-20 RX ORDER — ALPRAZOLAM 0.5 MG/1
0.5 TABLET ORAL NIGHTLY
Status: DISCONTINUED | OUTPATIENT
Start: 2018-12-20 | End: 2018-12-21

## 2018-12-20 RX ORDER — BACLOFEN 10 MG/1
10 TABLET ORAL 3 TIMES DAILY
Status: DISCONTINUED | OUTPATIENT
Start: 2018-12-20 | End: 2018-12-21

## 2018-12-20 RX ORDER — CITALOPRAM 10 MG/1
10 TABLET ORAL DAILY
Status: DISCONTINUED | OUTPATIENT
Start: 2018-12-21 | End: 2018-12-25 | Stop reason: HOSPADM

## 2018-12-20 RX ORDER — ONDANSETRON 2 MG/ML
4 INJECTION INTRAMUSCULAR; INTRAVENOUS EVERY 6 HOURS PRN
Status: DISCONTINUED | OUTPATIENT
Start: 2018-12-20 | End: 2018-12-25 | Stop reason: HOSPADM

## 2018-12-20 RX ORDER — POTASSIUM CHLORIDE 1.5 G/1.77G
20 POWDER, FOR SOLUTION ORAL DAILY
Status: DISCONTINUED | OUTPATIENT
Start: 2018-12-21 | End: 2018-12-22

## 2018-12-20 RX ORDER — SODIUM CHLORIDE 0.9 % (FLUSH) 0.9 %
10 SYRINGE (ML) INJECTION PRN
Status: DISCONTINUED | OUTPATIENT
Start: 2018-12-20 | End: 2018-12-25 | Stop reason: HOSPADM

## 2018-12-20 RX ORDER — TRAZODONE HYDROCHLORIDE 50 MG/1
50 TABLET ORAL NIGHTLY
Status: DISCONTINUED | OUTPATIENT
Start: 2018-12-20 | End: 2018-12-25 | Stop reason: HOSPADM

## 2018-12-20 RX ORDER — TOPIRAMATE 25 MG/1
25 TABLET ORAL NIGHTLY
Status: DISCONTINUED | OUTPATIENT
Start: 2018-12-20 | End: 2018-12-25 | Stop reason: HOSPADM

## 2018-12-20 RX ORDER — GABAPENTIN 100 MG/1
100 CAPSULE ORAL 3 TIMES DAILY
Status: DISCONTINUED | OUTPATIENT
Start: 2018-12-20 | End: 2018-12-21

## 2018-12-20 RX ORDER — DOCUSATE SODIUM 100 MG/1
100 CAPSULE, LIQUID FILLED ORAL 2 TIMES DAILY
Status: DISCONTINUED | OUTPATIENT
Start: 2018-12-20 | End: 2018-12-25 | Stop reason: HOSPADM

## 2018-12-20 RX ORDER — CARVEDILOL 12.5 MG/1
12.5 TABLET ORAL 2 TIMES DAILY WITH MEALS
Status: DISCONTINUED | OUTPATIENT
Start: 2018-12-20 | End: 2018-12-24

## 2018-12-20 RX ORDER — SODIUM CHLORIDE 0.9 % (FLUSH) 0.9 %
10 SYRINGE (ML) INJECTION EVERY 12 HOURS SCHEDULED
Status: DISCONTINUED | OUTPATIENT
Start: 2018-12-20 | End: 2018-12-25 | Stop reason: HOSPADM

## 2018-12-20 RX ORDER — OLANZAPINE 2.5 MG/1
2.5 TABLET ORAL NIGHTLY
Status: DISCONTINUED | OUTPATIENT
Start: 2018-12-20 | End: 2018-12-25 | Stop reason: HOSPADM

## 2018-12-20 RX ORDER — 0.9 % SODIUM CHLORIDE 0.9 %
1000 INTRAVENOUS SOLUTION INTRAVENOUS ONCE
Status: COMPLETED | OUTPATIENT
Start: 2018-12-20 | End: 2018-12-20

## 2018-12-20 RX ORDER — ACETAMINOPHEN AND CODEINE PHOSPHATE 60; 300 MG/1; MG/1
1 TABLET ORAL EVERY 6 HOURS PRN
Status: DISCONTINUED | OUTPATIENT
Start: 2018-12-20 | End: 2018-12-20 | Stop reason: CLARIF

## 2018-12-20 RX ORDER — ASPIRIN 81 MG/1
81 TABLET, CHEWABLE ORAL DAILY
Status: DISCONTINUED | OUTPATIENT
Start: 2018-12-21 | End: 2018-12-25 | Stop reason: HOSPADM

## 2018-12-20 RX ORDER — HYDROCODONE BITARTRATE AND ACETAMINOPHEN 5; 325 MG/1; MG/1
1 TABLET ORAL EVERY 6 HOURS PRN
Status: DISCONTINUED | OUTPATIENT
Start: 2018-12-20 | End: 2018-12-25 | Stop reason: HOSPADM

## 2018-12-20 RX ORDER — FERROUS SULFATE TAB EC 324 MG (65 MG FE EQUIVALENT) 324 (65 FE) MG
325 TABLET DELAYED RESPONSE ORAL
Status: DISCONTINUED | OUTPATIENT
Start: 2018-12-21 | End: 2018-12-25 | Stop reason: HOSPADM

## 2018-12-20 RX ADMIN — BACLOFEN 10 MG: 10 TABLET ORAL at 21:35

## 2018-12-20 RX ADMIN — ALPRAZOLAM 0.5 MG: 0.5 TABLET ORAL at 21:36

## 2018-12-20 RX ADMIN — Medication 10 ML: at 21:56

## 2018-12-20 RX ADMIN — HEPARIN SODIUM 5000 UNITS: 5000 INJECTION INTRAVENOUS; SUBCUTANEOUS at 21:36

## 2018-12-20 RX ADMIN — DOCUSATE SODIUM 100 MG: 100 CAPSULE, LIQUID FILLED ORAL at 21:36

## 2018-12-20 RX ADMIN — SODIUM CHLORIDE 1000 ML: 9 INJECTION, SOLUTION INTRAVENOUS at 14:00

## 2018-12-20 RX ADMIN — HEPARIN SODIUM 5000 UNITS: 5000 INJECTION INTRAVENOUS; SUBCUTANEOUS at 16:44

## 2018-12-20 RX ADMIN — TOPIRAMATE 25 MG: 25 TABLET, FILM COATED ORAL at 21:35

## 2018-12-20 RX ADMIN — CARVEDILOL 12.5 MG: 12.5 TABLET, FILM COATED ORAL at 16:44

## 2018-12-20 RX ADMIN — SODIUM CHLORIDE: 9 INJECTION, SOLUTION INTRAVENOUS at 21:56

## 2018-12-20 RX ADMIN — GABAPENTIN 100 MG: 100 CAPSULE ORAL at 16:44

## 2018-12-20 RX ADMIN — OLANZAPINE 2.5 MG: 2.5 TABLET, FILM COATED ORAL at 21:36

## 2018-12-20 RX ADMIN — SODIUM CHLORIDE: 9 INJECTION, SOLUTION INTRAVENOUS at 16:41

## 2018-12-20 RX ADMIN — GABAPENTIN 100 MG: 100 CAPSULE ORAL at 21:35

## 2018-12-20 RX ADMIN — TRAZODONE HYDROCHLORIDE 50 MG: 50 TABLET ORAL at 21:35

## 2018-12-20 ASSESSMENT — PAIN SCALES - GENERAL: PAINLEVEL_OUTOF10: 0

## 2018-12-20 NOTE — ED PROVIDER NOTES
[address]  [address] [facility] [address]  Dept: [address]: [phone]  Evaluated by the advanced practice provider  eMERGENCY dEPARTMENT eNCOUnter        CHIEF COMPLAINT    Chief Complaint   Patient presents with    Other     twitching daughter sent pt because she has been twitching pt was recently in hospital for fall     Diarrhea       HPI    Diana Verde is a 80 y.o. female who arrives to the emergency department via EMS. The context is that this is a demented patient lives at home with her daughter. Daughter reports that she had a recent hospitalization and was discharged 6 days ago. Since then the patient has declined. Daughter reports the patient has not been eating or drinking since discharge. She has also developed diarrhea since the time of discharge. There is also complaint at the daughter is noticing the patient having \"twitching\" movements in her extremities that is new today. The patient denies abdominal pain, chest pain or shortness of breath. I am unable to determine if the patient has had any fevers or has urinary symptoms based on the dementia. The daughter is not at the bedside    REVIEW OF SYSTEMS    Cardiac: No chest pain or palpitations  Respiratory: No shortness of breath or new cough  General: see HPI  GI: No abdominal pain, + diarrhea  Neuro: extremity twitches  All other systems are difficult to obtain secondary to the patient's baseline dementia.     PAST MEDICAL & SURGICAL HISTORY    Past Medical History:   Diagnosis Date    Dementia     Hypertension      Past Surgical History:   Procedure Laterality Date    CARDIAC SURGERY      COLON SURGERY      colon resection d/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   [facility] HIP SURGERY      right    HYSTERECTOMY      KNEE SURGERY      bilateral       CURRENT MEDICATIONS    Current Outpatient Rx   Medication Sig Dispense Refill    potassium chloride [address] [facility]   Phone [phone]   COMPREHENSIVE METABOLIC PANEL - Abnormal; Notable for the following:     CREATININE 2.2 (*)     GFR Non- 21 (*)     GFR African American 26 (*)     Albumin/Globulin Ratio 0.8 (*)     ALT <5 (*)     AST 9 (*)     All other components within normal limits    Narrative:     Performed at:  [place] [address]   Phone [phone]   GASTROINTESTINAL PANEL BY DNA   C DIFF TOXIN B BY RT PCR   URINE RT REFLEX TO CULTURE    Narrative:     Performed at:  [place] [address]   Phone [phone]   TROPONIN    Narrative:     Performed at:  [place] [address]   Phone [phone]       ED COURSE & MEDICAL DECISION MAKING    Pertinent Labs & Imaging studies reviewed and interpreted. (See chart for details)    See chart for details of medications given during the ED stay. Vitals:    12/20/18 1130   BP: (!) 105/56   Pulse: 94   Resp: 18   Temp: 97.9 °F (36.6 °C)   TempSrc: Oral   SpO2: 93%   Weight: 98 lb 1.7 oz (44.5 kg)   Height: 5' 1\" (1.549 m)     Medications   0.9 % sodium chloride bolus (1,000 mLs Intravenous New Bag 12/20/18 1400)     I have evaluated this patient. My supervising physician was available consultation. Differential diagnosis includes but is not limited to anemia, dehydration, ACS, electrolyte derangement, infectious process, other. She is nontoxic in appearance and hemodynamically stable. Has dementia and lives at home with daughter. Per EMS report or the daughter stated that since she has been discharged from the hospital 8 days ago she is not been taking in solids or liquids and he has developed diarrhea. The daughter is noted not to has been in the emergency department. The patient is pleasant and follows commands.   She does

## 2018-12-20 NOTE — ED NOTES
Bed: -06  Expected date:   Expected time:   Means of arrival: [facility]  Comments:  83F Weakness, diarrhea     Mahendra Rosenberg RN  12/20/18 3014

## 2018-12-21 ENCOUNTER — APPOINTMENT (OUTPATIENT)
Dept: GENERAL RADIOLOGY | Age: 83
DRG: 683 | End: 2018-12-21
Payer: MEDICARE

## 2018-12-21 ENCOUNTER — TELEPHONE (OUTPATIENT)
Dept: ORTHOPEDIC SURGERY | Age: 83
End: 2018-12-21

## 2018-12-21 LAB
A/G RATIO: 0.8 (ref 1.1–2.2)
ALBUMIN SERPL-MCNC: 2.9 G/DL (ref 3.4–5)
ALP BLD-CCNC: 77 U/L (ref 40–129)
ALT SERPL-CCNC: <5 U/L (ref 10–40)
AMMONIA: 46 UMOL/L (ref 11–51)
ANION GAP SERPL CALCULATED.3IONS-SCNC: 14 MMOL/L (ref 3–16)
AST SERPL-CCNC: 7 U/L (ref 15–37)
BILIRUB SERPL-MCNC: 0.3 MG/DL (ref 0–1)
BUN BLDV-MCNC: 18 MG/DL (ref 7–20)
CALCIUM SERPL-MCNC: 8.1 MG/DL (ref 8.3–10.6)
CHLORIDE BLD-SCNC: 108 MMOL/L (ref 99–110)
CO2: 18 MMOL/L (ref 21–32)
CREAT SERPL-MCNC: 2.5 MG/DL (ref 0.6–1.2)
CREATININE URINE: 64 MG/DL (ref 28–259)
GFR AFRICAN AMERICAN: 22
GFR NON-AFRICAN AMERICAN: 18
GLOBULIN: 3.7 G/DL
GLUCOSE BLD-MCNC: 87 MG/DL (ref 70–99)
GLUCOSE BLD-MCNC: 92 MG/DL (ref 70–99)
GLUCOSE BLD-MCNC: 94 MG/DL (ref 70–99)
PERFORMED ON: NORMAL
PERFORMED ON: NORMAL
POTASSIUM REFLEX MAGNESIUM: 4.2 MMOL/L (ref 3.5–5.1)
SODIUM BLD-SCNC: 140 MMOL/L (ref 136–145)
SODIUM URINE: 36 MMOL/L
TOTAL PROTEIN: 6.6 G/DL (ref 6.4–8.2)
TROPONIN: 0.01 NG/ML

## 2018-12-21 PROCEDURE — 97530 THERAPEUTIC ACTIVITIES: CPT

## 2018-12-21 PROCEDURE — 94760 N-INVAS EAR/PLS OXIMETRY 1: CPT

## 2018-12-21 PROCEDURE — 36415 COLL VENOUS BLD VENIPUNCTURE: CPT

## 2018-12-21 PROCEDURE — 97162 PT EVAL MOD COMPLEX 30 MIN: CPT

## 2018-12-21 PROCEDURE — 2580000003 HC RX 258: Performed by: HOSPITALIST

## 2018-12-21 PROCEDURE — G8978 MOBILITY CURRENT STATUS: HCPCS

## 2018-12-21 PROCEDURE — 6370000000 HC RX 637 (ALT 250 FOR IP): Performed by: INTERNAL MEDICINE

## 2018-12-21 PROCEDURE — 99221 1ST HOSP IP/OBS SF/LOW 40: CPT | Performed by: NURSE PRACTITIONER

## 2018-12-21 PROCEDURE — 6360000002 HC RX W HCPCS: Performed by: INTERNAL MEDICINE

## 2018-12-21 PROCEDURE — G8988 SELF CARE GOAL STATUS: HCPCS

## 2018-12-21 PROCEDURE — 1200000000 HC SEMI PRIVATE

## 2018-12-21 PROCEDURE — 97535 SELF CARE MNGMENT TRAINING: CPT

## 2018-12-21 PROCEDURE — 97166 OT EVAL MOD COMPLEX 45 MIN: CPT

## 2018-12-21 PROCEDURE — 82140 ASSAY OF AMMONIA: CPT

## 2018-12-21 PROCEDURE — 2580000003 HC RX 258: Performed by: INTERNAL MEDICINE

## 2018-12-21 PROCEDURE — G8987 SELF CARE CURRENT STATUS: HCPCS

## 2018-12-21 PROCEDURE — 73560 X-RAY EXAM OF KNEE 1 OR 2: CPT

## 2018-12-21 PROCEDURE — 84484 ASSAY OF TROPONIN QUANT: CPT

## 2018-12-21 PROCEDURE — 80053 COMPREHEN METABOLIC PANEL: CPT

## 2018-12-21 PROCEDURE — G8979 MOBILITY GOAL STATUS: HCPCS

## 2018-12-21 RX ORDER — GABAPENTIN 100 MG/1
200 CAPSULE ORAL EVERY MORNING
Status: ON HOLD | COMMUNITY
End: 2018-12-25 | Stop reason: HOSPADM

## 2018-12-21 RX ADMIN — CARVEDILOL 12.5 MG: 12.5 TABLET, FILM COATED ORAL at 17:13

## 2018-12-21 RX ADMIN — CARVEDILOL 12.5 MG: 12.5 TABLET, FILM COATED ORAL at 08:25

## 2018-12-21 RX ADMIN — CITALOPRAM HYDROBROMIDE 10 MG: 10 TABLET ORAL at 08:25

## 2018-12-21 RX ADMIN — DOCUSATE SODIUM 100 MG: 100 CAPSULE, LIQUID FILLED ORAL at 08:25

## 2018-12-21 RX ADMIN — SODIUM CHLORIDE: 9 INJECTION, SOLUTION INTRAVENOUS at 10:55

## 2018-12-21 RX ADMIN — ASPIRIN 81 MG 81 MG: 81 TABLET ORAL at 08:25

## 2018-12-21 RX ADMIN — BACLOFEN 10 MG: 10 TABLET ORAL at 08:25

## 2018-12-21 RX ADMIN — HEPARIN SODIUM 5000 UNITS: 5000 INJECTION INTRAVENOUS; SUBCUTANEOUS at 05:45

## 2018-12-21 RX ADMIN — HEPARIN SODIUM 5000 UNITS: 5000 INJECTION INTRAVENOUS; SUBCUTANEOUS at 21:18

## 2018-12-21 RX ADMIN — SODIUM CHLORIDE: 9 INJECTION, SOLUTION INTRAVENOUS at 21:13

## 2018-12-21 RX ADMIN — FERROUS SULFATE TAB EC 324 MG (65 MG FE EQUIVALENT) 325 MG: 324 (65 FE) TABLET DELAYED RESPONSE at 08:24

## 2018-12-21 RX ADMIN — GABAPENTIN 100 MG: 100 CAPSULE ORAL at 08:25

## 2018-12-21 RX ADMIN — POTASSIUM CHLORIDE 20 MEQ: 1.5 POWDER, FOR SOLUTION ORAL at 08:25

## 2018-12-21 RX ADMIN — HEPARIN SODIUM 5000 UNITS: 5000 INJECTION INTRAVENOUS; SUBCUTANEOUS at 14:13

## 2018-12-21 ASSESSMENT — PAIN SCALES - GENERAL: PAINLEVEL_OUTOF10: 0

## 2018-12-21 ASSESSMENT — PAIN SCALES - WONG BAKER: WONGBAKER_NUMERICALRESPONSE: 0

## 2018-12-21 NOTE — TELEPHONE ENCOUNTER
Consult seen . Xray ordered. Leaving 10am today and off until next Weds AM only. Please watch for xray result.  Thx

## 2018-12-21 NOTE — CARE COORDINATION
Received order to assist with ecf placement. Spoke with daughter who is interested in [facility], daughter has already toured this facility. Info sent. Message left with Kelsey in admissions at [phone]. Will await ecf decision and assist with placement. Later received call from Jd Shah at [facility], [facility] that bed is available for pt on 12/23/2018. Plan to arrange transfer upon dc order. imm and hens are completed and on chart.     Electronically signed by YUN Nath on 12/21/2018 at 2:46 PM

## 2018-12-21 NOTE — PROGRESS NOTES
directions  Insights: Not aware of deficits               LUE AROM (degrees)  LUE AROM : Exceptions  LUE General AROM: has some high flexion tone in L hand and elbow  RUE AROM (degrees)  RUE AROM : WFL                 Assessment   Performance deficits / Impairments: Decreased functional mobility ; Decreased ADL status; Decreased cognition  Prognosis: Poor; Fair  Decision Making: Medium Complexity  History: hx of L TKA, HTN, dementia, now with EMELY, +C-Diff, weakness, and L distal femur fx  Exam: ADLS, mobility  Assistance / Modification: cues, equipment, assist of 2  REQUIRES OT FOLLOW UP: Yes  Activity Tolerance  Activity Tolerance: Treatment limited secondary to medical complications (free text); Treatment limited secondary to decreased cognition  Safety Devices  Safety Devices in place: Yes  Type of devices: Nurse notified; Left in bed;Call light within reach; Bed alarm in place         Plan   Plan  Times per week: 3-5x  Current Treatment Recommendations: Functional Mobility Training, Self-Care / ADL, Equipment Evaluation, Education, & procurement, Patient/Caregiver Education & Training    G-Code  OT G-codes  Functional Limitation: Self care  Self Care Current Status (): At least 60 percent but less than 80 percent impaired, limited or restricted  Self Care Goal Status (): At least 40 percent but less than 60 percent impaired, limited or restricted  OutComes Score    Katia Husain scored a 9/24 on the AM-[facility] ADL Inpatient form. Current research shows that an AM-PAC score of 17 or less is typically not associated with a discharge to the patient's home setting. Based on the patients AM-PAC score and their current ADL deficits, it is recommended that the patient have 3-5 sessions per week of Occupational Therapy at d/c to increase the patients independence.                                                    AM-PAC Score        AM-PAC Inpatient Daily Activity Raw Score: 9  AM-PAC Inpatient ADL T-Scale Score : 25.33  ADL Inpatient CMS 0-100% Score: 79.59  ADL Inpatient CMS G-Code Modifier : CL    Goals  Short term goals  Time Frame for Short term goals: at d/c:  Short term goal 1: Mod A for grooming  Short term goal 2: Max A of 1 for bathing/ dressing  Short term goal 3: Min A for bed mobility  Short term goal 4: Pt to tolerate transfers when wt bearing status is ordered  Patient Goals   Patient goals : pt unable       Therapy Time   Individual Concurrent Group Co-treatment   Time In 1047         Time Out 1146         Minutes Terrance Culver

## 2018-12-21 NOTE — PROGRESS NOTES
Notified Dr. Jean Pierre Escoto of left knee x-ray result today. Order to obtain an immobilizer and he would be back to place it on pt.

## 2018-12-21 NOTE — PROGRESS NOTES
Nephrology progress  Note      History of Present Ilness:    Deanna Robles is a 80 y.o. female with h/o dementia, HTN who was recently discharged form hospital. Was admitted previous admission for UTI. Had klebsiella for which she was treated with ciprofloxacin. Now presented with weakness and diarrhea. She is awake and alert. Creatinine level is elevated at 2.2. It was 0.8  six days ago  BP low in ER    Interval Hx :    Vitals stable  Response level decreased from yesterday      Past Medical History:   Diagnosis Date    Dementia     Hypertension        Past Surgical History:   Procedure Laterality Date    CARDIAC SURGERY      COLON SURGERY      colon resection d/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   [facility] HIP SURGERY      right    HYSTERECTOMY      KNEE SURGERY      bilateral         Current Medications:      ALPRAZolam (XANAX) tablet 0.5 mg Nightly   aspirin chewable tablet 81 mg Daily   carvedilol (COREG) tablet 12.5 mg BID WC   citalopram (CELEXA) tablet 10 mg Daily   ferrous sulfate EC tablet 325 mg Daily with breakfast   gabapentin (NEURONTIN) capsule 100 mg TID   OLANZapine (ZYPREXA) tablet 2.5 mg Nightly   potassium chloride (KLOR-CON) packet 20 mEq Daily   topiramate (TOPAMAX) tablet 25 mg Nightly   traZODone (DESYREL) tablet 50 mg Nightly   sodium chloride flush 0.9 % injection 10 mL 2 times per day   sodium chloride flush 0.9 % injection 10 mL PRN   docusate sodium (COLACE) capsule 100 mg BID   ondansetron (ZOFRAN) injection 4 mg Q6H PRN   heparin (porcine) injection 5,000 Units 3 times per day   0.9 % sodium chloride infusion Continuous   HYDROcodone-acetaminophen (NORCO) 5-325 MG per tablet 1 tablet Q6H PRN   baclofen (LIORESAL) tablet 10 mg TID           Physical exam:     Vitals:  /60   Pulse 63   Temp 97.3 °F (36.3 °C) (Axillary)   Resp 16   Ht 5' 1\" (1.549 m)   Wt 98 lb 1.7 oz (44.5 kg)   SpO2 97%   Breastfeeding?  No   BMI 18.54 kg/m²   Constitutional:  AA OX 2  Skin: no rash, turgor

## 2018-12-21 NOTE — PROGRESS NOTES
Physical Therapy    Facility/Department: [address] MED SURG  Initial Assessment  (co-eval)  If pt. is D/C'd prior to next visit please refer back to last daily progress note for D/C status. NAME: Juan Ramon Alfonso  : 1935  MRN: [id number]    Date of Service: 2018    Discharge Recommendations:  Patient would benefit from continued therapy after discharge, 3-5 sessions per week   Juan Ramon Alfonso scored a 8/24 on the AM-PAC short mobility form. Current research shows that an AM-PAC score of 17 or less is typically not associated with a discharge to the patient's home setting. Based on the patients AM-PAC score and their current functional mobility deficits, it is recommended that the patient have 3-5 sessions per week of Physical Therapy at d/c to increase the patients independence. PT Equipment Recommendations  Equipment Needed: No    Patient Diagnosis(es): The primary encounter diagnosis was EMELY (acute kidney injury) ([facility] [address] Diagnoses of Failure to thrive in adult, Diarrhea, unspecified type, and General weakness were also pertinent to this visit. has a past medical history of Dementia and Hypertension. has a past surgical history that includes knee surgery; hip surgery; Hysterectomy; Cardiac surgery; and Colon surgery. Restrictions  Restrictions/Precautions  Restrictions/Precautions: Fall Risk, Contact Precautions  Position Activity Restriction  Other position/activity restrictions: dementia; c-diff and distal femur fx L (?? acute) (Simultaneous filing. User may not have seen previous data.)  Vision/Hearing  Vision: Impaired  Vision Exceptions: Wears glasses for reading  Hearing: Within functional limits     Subjective  General  Chart Reviewed: Yes  Additional Pertinent Hx: Per H&P on 18 of Elkin Alexander MD: The pt came to the ED per her dgt with increased weakness and diarrhea. The pt recently d/c on 18 to home after a fall at home and she was treated for UTI.  Per dgt's report the commands  Attention Span: Difficulty attending to directions  Insights: Not aware of deficits    Objective  PROM RLE (degrees)  RLE General PROM: knee flexion contracture to ~30 degrees  PROM LLE (degrees)  LLE General PROM: knee flexion contracture to ~80 degrees  Strength RLE  Comment: unable to assess  Strength LLE  Comment: unable to assess        Bed mobility  Rolling to Left: Maximum assistance;2 Person assistance  Rolling to Right: Maximum assistance;2 Person assistance  Supine to Sit: Maximum assistance;2 Person assistance (and another to assist with lines)  Sit to Supine: Maximum assistance;2 Person assistance (with another for line management)  Scooting: Maximal assistance  Transfers  Bed to Chair: Unable to assess  Comment: anticipate maxi-lift for safe transfer to the chair  Ambulation  Ambulation?: No     Balance  Comments: the pt sat on the EOB x 3 minutes with initial mod A but improved to close SBA with UE support of the R hand on the rail        Assessment   Assessment: The pt is an 81 yo female brought in to the ED with ARF and diarrhea; found to have c-diff. The pt recently d/c from hospital after a fall and UTI. The pt's dgt not in the room today and the pt unable to give current info, but per ED note the pt has been in bed since last d/c and has not been able to walk. PMHx: dementia, HTN, R hip sx, B TKR, colon Ca with resection      The pt was essentially non-verbal today and was unable to follow directions. The pt needed max A of 1-2 for all bed mobility, was dependent for allegra-care following a BM and was able to sit on the EOB x 3 minutes with close SBA. Transfers were not attempted until weightbearing is known but anticipate that she will need a maxi-LIFT. The pt appears to be unsafe for return to home and would benefit from con't skilled PT at light intensity. Will con't to follow.    Treatment Diagnosis: impaired mobility  Specific instructions for Next Treatment: cotx  Prognosis:

## 2018-12-22 LAB
ALBUMIN SERPL-MCNC: 2.9 G/DL (ref 3.4–5)
ANION GAP SERPL CALCULATED.3IONS-SCNC: 16 MMOL/L (ref 3–16)
BUN BLDV-MCNC: 18 MG/DL (ref 7–20)
C DIFFICILE TOXIN, EIA: NORMAL
CALCIUM SERPL-MCNC: 8.5 MG/DL (ref 8.3–10.6)
CHLORIDE BLD-SCNC: 111 MMOL/L (ref 99–110)
CO2: 16 MMOL/L (ref 21–32)
CREAT SERPL-MCNC: 2.5 MG/DL (ref 0.6–1.2)
GFR AFRICAN AMERICAN: 22
GFR NON-AFRICAN AMERICAN: 18
GLUCOSE BLD-MCNC: 91 MG/DL (ref 70–99)
PHOSPHORUS: 4.7 MG/DL (ref 2.5–4.9)
POTASSIUM SERPL-SCNC: 4.7 MMOL/L (ref 3.5–5.1)
PREALBUMIN: 6.4 MG/DL (ref 20–40)
SODIUM BLD-SCNC: 143 MMOL/L (ref 136–145)

## 2018-12-22 PROCEDURE — 2580000003 HC RX 258: Performed by: HOSPITALIST

## 2018-12-22 PROCEDURE — 87449 NOS EACH ORGANISM AG IA: CPT

## 2018-12-22 PROCEDURE — 6360000002 HC RX W HCPCS: Performed by: INTERNAL MEDICINE

## 2018-12-22 PROCEDURE — 87505 NFCT AGENT DETECTION GI: CPT

## 2018-12-22 PROCEDURE — 27508 TREATMENT OF THIGH FRACTURE: CPT | Performed by: ORTHOPAEDIC SURGERY

## 2018-12-22 PROCEDURE — 2580000003 HC RX 258: Performed by: INTERNAL MEDICINE

## 2018-12-22 PROCEDURE — 80069 RENAL FUNCTION PANEL: CPT

## 2018-12-22 PROCEDURE — 6370000000 HC RX 637 (ALT 250 FOR IP): Performed by: INTERNAL MEDICINE

## 2018-12-22 PROCEDURE — 99232 SBSQ HOSP IP/OBS MODERATE 35: CPT | Performed by: ORTHOPAEDIC SURGERY

## 2018-12-22 PROCEDURE — 87046 STOOL CULTR AEROBIC BACT EA: CPT

## 2018-12-22 PROCEDURE — 84134 ASSAY OF PREALBUMIN: CPT

## 2018-12-22 PROCEDURE — 36415 COLL VENOUS BLD VENIPUNCTURE: CPT

## 2018-12-22 PROCEDURE — 87324 CLOSTRIDIUM AG IA: CPT

## 2018-12-22 PROCEDURE — 87493 C DIFF AMPLIFIED PROBE: CPT

## 2018-12-22 PROCEDURE — 1200000000 HC SEMI PRIVATE

## 2018-12-22 RX ADMIN — SODIUM CHLORIDE: 9 INJECTION, SOLUTION INTRAVENOUS at 16:37

## 2018-12-22 RX ADMIN — HEPARIN SODIUM 5000 UNITS: 5000 INJECTION INTRAVENOUS; SUBCUTANEOUS at 06:30

## 2018-12-22 RX ADMIN — HEPARIN SODIUM 5000 UNITS: 5000 INJECTION INTRAVENOUS; SUBCUTANEOUS at 13:50

## 2018-12-22 RX ADMIN — HEPARIN SODIUM 5000 UNITS: 5000 INJECTION INTRAVENOUS; SUBCUTANEOUS at 21:57

## 2018-12-22 RX ADMIN — SODIUM CHLORIDE: 9 INJECTION, SOLUTION INTRAVENOUS at 05:49

## 2018-12-22 RX ADMIN — Medication 10 ML: at 09:01

## 2018-12-22 RX ADMIN — Medication 10 ML: at 21:57

## 2018-12-22 NOTE — PROGRESS NOTES
without murmurs, rubs or gallops. Abdomen: Soft, non-tender, non-distended with normal bowel sounds. Musculoskeletal: No clubbing, cyanosis no dependent edema  Skin: Skin color, texture, turgor normal.  No rashes or lesions. Neurologic: Psychiatric: Obtunded  Capillary Refill: Brisk,< 3 seconds   Peripheral Pulses: +2 palpable, equal bilaterally       Labs:   Recent Labs      12/20/18   1148   WBC  8.2   HGB  10.2*   HCT  31.3*   PLT  544*     Recent Labs      12/20/18   1147  12/21/18   0632  12/22/18   0558   NA  138  140  143   K  4.6  4.2  4.7   CL  101  108  111*   CO2  23  18*  16*   BUN  18 18 18   CREATININE  2.2*  2.5*  2.5*   CALCIUM  8.8  8.1*  8.5   PHOS   --    --   4.7     Recent Labs      12/20/18   1147  12/21/18   0632   AST  9*  7*   ALT  <5*  <5*   BILITOT  0.4  0.3   ALKPHOS  88  77     No results for input(s): INR in the last 72 hours. Recent Labs      12/20/18   1148  12/21/18   1710   TROPONINI  <0.01  0.01       Urinalysis:      Lab Results   Component Value Date    NITRU Negative 12/20/2018    WBCUA 78 12/12/2018    BACTERIA 1+ 12/12/2018    RBCUA 1 12/12/2018    BLOODU Negative 12/20/2018    SPECGRAV 1.006 12/20/2018    GLUCOSEU Negative 12/20/2018       Radiology:  XR KNEE LEFT (1-2 VIEWS)   Final Result   1. Acute fracture of the distal femur. XR CHEST PORTABLE   Final Result   No acute cardiopulmonary disease. Assessment/Plan:    Active Hospital Problems    Diagnosis Date Noted    Left knee pain [M25.562]     ARF (acute renal failure) ([facility] [address] [N17.9] 12/20/2018     1) EMELY - continue IVF's, BP stable. Suspect pre renal. Currently without sings of fluid overload.  Continue IVF's  2) diarrhea - c diff ordered, no lbm since arrival. Was on cipro for UTI less than a week ago  3) UTI - treated, no pyuria noted on urinalysis  4) Encephalopathy - vs dementia, normal ammonia, may need to resume patient's xanax  5) malnutrition - check prealbumin    DVT Prophylaxis:

## 2018-12-22 NOTE — CONSULTS
patient or family and are grossly negative. PHYSICAL EXAMINATION:  Ms. Leighann Garcia is a very pleasant 80 y.o. female who seen today in no acute distress, awake, alert, and oriented. She is well nourished and groomed. Patient with normal affect. Body mass index is 17.62 kg/m². . Skin warm and dry. Resting respiratory rate is 16. Resp deep and easy. Pulse is with regular rate and rhythm    BP (!) 148/71   Pulse 105   Temp 97.9 °F (36.6 °C) (Axillary)   Resp 18   Ht 5' 1\" (1.549 m)   Wt 93 lb 4.1 oz (42.3 kg)   SpO2 96%   Breastfeeding? No   BMI 17.62 kg/m²        Airway is intact  Chest: chest clear, no wheezing, rales, normal symmetric air entry, no tachypnea, retractions or cyanosis  Heart: regular rate and rhythm ; heart sounds normal   Hearing intact, pupil equal and reactive bilateral  Lymphatics; No groin or axillary enlarged lymph nodes. Neck; No swelling  Abdomen; soft, non distended. MUSCULOSKELETAL:   Examination of both knees showing a decreased range of motion of the left knee compare to the other side with flexion contracture bilateral knees. There is minimal swelling that can be seen, as well as ecchymosis. She has intact sensation and good pedal pulses. She has mild tenderness on deep palpation over the left knee.      NEUROLOGIC:   Sensory:    Touch:                     Right Upper Extremity:  normal                   Left Upper Extremity:  normal                  Right Lower Extremity:  normal                  Left Lower Extremity:  normal        DATA:    CBC:   Lab Results   Component Value Date    WBC 8.2 12/20/2018    RBC 4.06 12/20/2018    HGB 10.2 12/20/2018    HCT 31.3 12/20/2018    MCV 77.1 12/20/2018    MCH 25.0 12/20/2018    MCHC 32.5 12/20/2018    RDW 15.1 12/20/2018     12/20/2018    MPV 6.6 12/20/2018     WBC:    Lab Results   Component Value Date    WBC 8.2 12/20/2018     PT/INR:    Lab Results   Component Value Date    PROTIME 12.8 02/01/2017    INR 1.12 02/01/2017

## 2018-12-22 NOTE — PLAN OF CARE
Problem: Falls - Risk of:  Goal: Will remain free from falls  Will remain free from falls   Outcome: Ongoing  Instructed pt.  To notify staff for assistance when getting up to prevent falls

## 2018-12-22 NOTE — PROGRESS NOTES
Check urine eosinophils   Recommend to dose adjust all medications  based on renal functions  AVOID NSAIDs  Avoid Nephrotoxins  Monitor I/O      2. Hypotension. Improved with iv fluids . Need accurate I/O   Will decrease rate of IV fluids. Monitor volume status     3. Diarrhea. Stool for c diff ordered     4. Acid- base disorder. Monitor     5. Electrolytes. Monitor     Response level still minimal.   gabapentin and baclofen was stopped.                Thank you for allowing us to participate in care of Edin Crigler         Electronically signed by: Heladio Willis MD, 12/22/2018, 1:20 PM      [facility] [address]  Office : [phone]  Fax :[phone]

## 2018-12-23 ENCOUNTER — APPOINTMENT (OUTPATIENT)
Dept: GENERAL RADIOLOGY | Age: 83
DRG: 683 | End: 2018-12-23
Payer: MEDICARE

## 2018-12-23 LAB
ALBUMIN SERPL-MCNC: 2.9 G/DL (ref 3.4–5)
ANION GAP SERPL CALCULATED.3IONS-SCNC: 19 MMOL/L (ref 3–16)
BASOPHILS ABSOLUTE: 0 K/UL (ref 0–0.2)
BASOPHILS RELATIVE PERCENT: 0.4 %
BUN BLDV-MCNC: 17 MG/DL (ref 7–20)
CALCIUM SERPL-MCNC: 8.6 MG/DL (ref 8.3–10.6)
CHLORIDE BLD-SCNC: 115 MMOL/L (ref 99–110)
CO2: 14 MMOL/L (ref 21–32)
CREAT SERPL-MCNC: 2.1 MG/DL (ref 0.6–1.2)
EOSINOPHILS ABSOLUTE: 0.1 K/UL (ref 0–0.6)
EOSINOPHILS RELATIVE PERCENT: 0.5 %
GFR AFRICAN AMERICAN: 27
GFR NON-AFRICAN AMERICAN: 22
GI BACTERIAL PATHOGENS BY PCR: NORMAL
GLUCOSE BLD-MCNC: 92 MG/DL (ref 70–99)
HCT VFR BLD CALC: 30.1 % (ref 36–48)
HEMOGLOBIN: 9.4 G/DL (ref 12–16)
LYMPHOCYTES ABSOLUTE: 1.1 K/UL (ref 1–5.1)
LYMPHOCYTES RELATIVE PERCENT: 9.6 %
MCH RBC QN AUTO: 24.5 PG (ref 26–34)
MCHC RBC AUTO-ENTMCNC: 31.2 G/DL (ref 31–36)
MCV RBC AUTO: 78.7 FL (ref 80–100)
MONOCYTES ABSOLUTE: 0.7 K/UL (ref 0–1.3)
MONOCYTES RELATIVE PERCENT: 6.1 %
NEUTROPHILS ABSOLUTE: 9.5 K/UL (ref 1.7–7.7)
NEUTROPHILS RELATIVE PERCENT: 83.4 %
PDW BLD-RTO: 15.9 % (ref 12.4–15.4)
PHOSPHORUS: 3.9 MG/DL (ref 2.5–4.9)
PLATELET # BLD: 572 K/UL (ref 135–450)
PMV BLD AUTO: 6.4 FL (ref 5–10.5)
POTASSIUM SERPL-SCNC: 3.8 MMOL/L (ref 3.5–5.1)
RBC # BLD: 3.83 M/UL (ref 4–5.2)
SODIUM BLD-SCNC: 148 MMOL/L (ref 136–145)
WBC # BLD: 11.4 K/UL (ref 4–11)

## 2018-12-23 PROCEDURE — 2580000003 HC RX 258: Performed by: INTERNAL MEDICINE

## 2018-12-23 PROCEDURE — 1200000000 HC SEMI PRIVATE

## 2018-12-23 PROCEDURE — 36415 COLL VENOUS BLD VENIPUNCTURE: CPT

## 2018-12-23 PROCEDURE — 85025 COMPLETE CBC W/AUTO DIFF WBC: CPT

## 2018-12-23 PROCEDURE — 2500000003 HC RX 250 WO HCPCS: Performed by: INTERNAL MEDICINE

## 2018-12-23 PROCEDURE — 94762 N-INVAS EAR/PLS OXIMTRY CONT: CPT

## 2018-12-23 PROCEDURE — 6360000002 HC RX W HCPCS: Performed by: INTERNAL MEDICINE

## 2018-12-23 PROCEDURE — 80069 RENAL FUNCTION PANEL: CPT

## 2018-12-23 PROCEDURE — 71045 X-RAY EXAM CHEST 1 VIEW: CPT

## 2018-12-23 PROCEDURE — 6370000000 HC RX 637 (ALT 250 FOR IP): Performed by: INTERNAL MEDICINE

## 2018-12-23 RX ORDER — ACETAMINOPHEN 650 MG/1
650 SUPPOSITORY RECTAL EVERY 4 HOURS PRN
Status: DISCONTINUED | OUTPATIENT
Start: 2018-12-23 | End: 2018-12-25 | Stop reason: HOSPADM

## 2018-12-23 RX ADMIN — HEPARIN SODIUM 5000 UNITS: 5000 INJECTION INTRAVENOUS; SUBCUTANEOUS at 07:34

## 2018-12-23 RX ADMIN — HEPARIN SODIUM 5000 UNITS: 5000 INJECTION INTRAVENOUS; SUBCUTANEOUS at 23:55

## 2018-12-23 RX ADMIN — HYDROCODONE BITARTRATE AND ACETAMINOPHEN 1 TABLET: 5; 325 TABLET ORAL at 20:50

## 2018-12-23 RX ADMIN — TOPIRAMATE 25 MG: 25 TABLET, FILM COATED ORAL at 20:50

## 2018-12-23 RX ADMIN — HEPARIN SODIUM 5000 UNITS: 5000 INJECTION INTRAVENOUS; SUBCUTANEOUS at 14:50

## 2018-12-23 RX ADMIN — SODIUM BICARBONATE: 84 INJECTION, SOLUTION INTRAVENOUS at 07:35

## 2018-12-23 ASSESSMENT — PAIN SCALES - GENERAL
PAINLEVEL_OUTOF10: 6
PAINLEVEL_OUTOF10: 4

## 2018-12-23 ASSESSMENT — PAIN DESCRIPTION - LOCATION: LOCATION: LEG

## 2018-12-23 ASSESSMENT — PAIN SCALES - WONG BAKER: WONGBAKER_NUMERICALRESPONSE: 4

## 2018-12-23 ASSESSMENT — PAIN DESCRIPTION - ORIENTATION: ORIENTATION: RIGHT

## 2018-12-23 NOTE — PROGRESS NOTES
Patient is responsive to pain. Speaks semi-coherently/ jumbled. She will not react to questions, won't eat, won't take any oral medications.

## 2018-12-23 NOTE — PROGRESS NOTES
creat trending down. likely ATN from hemodynamic changes . Reviewed urine studies  UTI. Check urine eosinophils   Recommend to dose adjust all medications  based on renal functions  AVOID NSAIDs  Avoid Nephrotoxins  Monitor I/O      2. Hypotension. Improved with iv fluids . Need accurate I/O   Will decrease rate of IV fluids. Monitor volume status     3. Diarrhea. Stool for c diff ordered     4. Acid- base disorder. HCo3 low. anion gap acidosis. Agree with biacrb based fluids     5. Hypernatremia. Iv fluids changed. Encourage oral water intake.                Thank you for allowing us to participate in care of Diana Verde         Electronically signed by: Oscar Foss MD, 12/23/2018, 8:35 AM      [facility] [address]  Office : [phone]  Fax :[phone]

## 2018-12-23 NOTE — PROGRESS NOTES
Serum HCO3 lower at 14    Changed ivf from ns to [id number] + 50 meq nahco3 at 100/hr    Electronically signed by Jose Armando Sanchez MD on 12/23/2018 at 6:25 AM

## 2018-12-23 NOTE — PROGRESS NOTES
rubs or gallops. Abdomen: Soft, non-tender, non-distended with normal bowel sounds. Musculoskeletal: No clubbing, cyanosis no dependent edema  Skin: Skin color, texture, turgor normal.  No rashes or lesions. Neurologic: Psychiatric: Obtunded  Capillary Refill: Brisk,< 3 seconds   Peripheral Pulses: +2 palpable, equal bilaterally       Labs:   Recent Labs      12/23/18   0515   WBC  11.4*   HGB  9.4*   HCT  30.1*   PLT  572*     Recent Labs      12/21/18   0632  12/22/18   0558  12/23/18   0515   NA  140  143  148*   K  4.2  4.7  3.8   CL  108  111*  115*   CO2  18*  16*  14*   BUN  18 18  17   CREATININE  2.5*  2.5*  2.1*   CALCIUM  8.1*  8.5  8.6   PHOS   --   4.7  3.9     Recent Labs      12/21/18   0632   AST  7*   ALT  <5*   BILITOT  0.3   ALKPHOS  77     No results for input(s): INR in the last 72 hours. Recent Labs      12/21/18   1710   TROPONINI  0.01       Urinalysis:      Lab Results   Component Value Date    NITRU Negative 12/20/2018    WBCUA 78 12/12/2018    BACTERIA 1+ 12/12/2018    RBCUA 1 12/12/2018    BLOODU Negative 12/20/2018    SPECGRAV 1.006 12/20/2018    GLUCOSEU Negative 12/20/2018       Radiology:  XR KNEE LEFT (1-2 VIEWS)   Final Result   1. Acute fracture of the distal femur. XR CHEST PORTABLE   Final Result   No acute cardiopulmonary disease.                  Assessment/Plan:    Active Hospital Problems    Diagnosis Date Noted    Closed supracondylar fracture of left femur ([facility] [address] [S72.452A]     Left knee pain [M25.562]     ARF (acute renal failure) ([facility] [address] [N17.9] 12/20/2018     1) EMELY - improved, Hypernatremic with IVF's changed, repeat renal in am  2) diarrhea - neg c diff  3) UTI - treated, no pyuria noted on urinalysis  4) Encephalopathy - more alert / improved  5) malnutrition -   6) L femoral fx - non operative management with NWB     DVT Prophylaxis: heparin sub cutaneous  Diet: DIET LOW SODIUM 2 GM;  Code Status: Full Code         Dispo - ECF 1-2 d    Harry Fonseca,

## 2018-12-23 NOTE — PLAN OF CARE
Problem: Risk for Impaired Skin Integrity  Goal: Tissue integrity - skin and mucous membranes  Structural intactness and normal physiological function of skin and  mucous membranes. Outcome: Ongoing  Skin assessment performed each shift per protocol. Skin care protocol in place.

## 2018-12-24 LAB
ALBUMIN SERPL-MCNC: 2.5 G/DL (ref 3.4–5)
ANION GAP SERPL CALCULATED.3IONS-SCNC: 17 MMOL/L (ref 3–16)
BASOPHILS ABSOLUTE: 0.1 K/UL (ref 0–0.2)
BASOPHILS RELATIVE PERCENT: 0.4 %
BILIRUBIN URINE: NEGATIVE
BLOOD, URINE: NEGATIVE
BUN BLDV-MCNC: 11 MG/DL (ref 7–20)
CALCIUM SERPL-MCNC: 7.7 MG/DL (ref 8.3–10.6)
CHLORIDE BLD-SCNC: 96 MMOL/L (ref 99–110)
CLARITY: CLEAR
CO2: 23 MMOL/L (ref 21–32)
COLOR: YELLOW
CREAT SERPL-MCNC: 1.4 MG/DL (ref 0.6–1.2)
EOSINOPHILS ABSOLUTE: 0.1 K/UL (ref 0–0.6)
EOSINOPHILS RELATIVE PERCENT: 0.9 %
EPITHELIAL CELLS, UA: 1 /HPF (ref 0–5)
GFR AFRICAN AMERICAN: 43
GFR NON-AFRICAN AMERICAN: 36
GLUCOSE BLD-MCNC: 118 MG/DL (ref 70–99)
GLUCOSE URINE: NEGATIVE MG/DL
HCT VFR BLD CALC: 31.5 % (ref 36–48)
HEMOGLOBIN: 10.2 G/DL (ref 12–16)
HYALINE CASTS: 1 /LPF (ref 0–8)
KETONES, URINE: NEGATIVE MG/DL
LEUKOCYTE ESTERASE, URINE: ABNORMAL
LYMPHOCYTES ABSOLUTE: 1.5 K/UL (ref 1–5.1)
LYMPHOCYTES RELATIVE PERCENT: 11.8 %
MCH RBC QN AUTO: 25 PG (ref 26–34)
MCHC RBC AUTO-ENTMCNC: 32.2 G/DL (ref 31–36)
MCV RBC AUTO: 77.6 FL (ref 80–100)
MICROSCOPIC EXAMINATION: YES
MONOCYTES ABSOLUTE: 1 K/UL (ref 0–1.3)
MONOCYTES RELATIVE PERCENT: 7.7 %
NEUTROPHILS ABSOLUTE: 10.3 K/UL (ref 1.7–7.7)
NEUTROPHILS RELATIVE PERCENT: 79.2 %
NITRITE, URINE: NEGATIVE
PDW BLD-RTO: 15 % (ref 12.4–15.4)
PH UA: 7.5
PHOSPHORUS: 2.8 MG/DL (ref 2.5–4.9)
PLATELET # BLD: 518 K/UL (ref 135–450)
PMV BLD AUTO: 6.3 FL (ref 5–10.5)
POTASSIUM SERPL-SCNC: 3 MMOL/L (ref 3.5–5.1)
PROTEIN UA: NEGATIVE MG/DL
RBC # BLD: 4.06 M/UL (ref 4–5.2)
RBC UA: 1 /HPF (ref 0–4)
SODIUM BLD-SCNC: 136 MMOL/L (ref 136–145)
SPECIFIC GRAVITY UA: 1.01
URINE TYPE: ABNORMAL
UROBILINOGEN, URINE: 0.2 E.U./DL
WBC # BLD: 13 K/UL (ref 4–11)
WBC UA: 10 /HPF (ref 0–5)

## 2018-12-24 PROCEDURE — 97535 SELF CARE MNGMENT TRAINING: CPT

## 2018-12-24 PROCEDURE — 2500000003 HC RX 250 WO HCPCS: Performed by: INTERNAL MEDICINE

## 2018-12-24 PROCEDURE — 6360000002 HC RX W HCPCS: Performed by: INTERNAL MEDICINE

## 2018-12-24 PROCEDURE — 2580000003 HC RX 258: Performed by: INTERNAL MEDICINE

## 2018-12-24 PROCEDURE — 51701 INSERT BLADDER CATHETER: CPT

## 2018-12-24 PROCEDURE — 97530 THERAPEUTIC ACTIVITIES: CPT

## 2018-12-24 PROCEDURE — 80069 RENAL FUNCTION PANEL: CPT

## 2018-12-24 PROCEDURE — 6370000000 HC RX 637 (ALT 250 FOR IP): Performed by: INTERNAL MEDICINE

## 2018-12-24 PROCEDURE — 81001 URINALYSIS AUTO W/SCOPE: CPT

## 2018-12-24 PROCEDURE — 1200000000 HC SEMI PRIVATE

## 2018-12-24 PROCEDURE — 85025 COMPLETE CBC W/AUTO DIFF WBC: CPT

## 2018-12-24 PROCEDURE — 36415 COLL VENOUS BLD VENIPUNCTURE: CPT

## 2018-12-24 RX ORDER — CARVEDILOL 3.12 MG/1
3.12 TABLET ORAL 2 TIMES DAILY WITH MEALS
Status: DISCONTINUED | OUTPATIENT
Start: 2018-12-24 | End: 2018-12-25 | Stop reason: HOSPADM

## 2018-12-24 RX ORDER — POTASSIUM CHLORIDE 1.5 G/1.77G
40 POWDER, FOR SOLUTION ORAL EVERY 4 HOURS
Status: COMPLETED | OUTPATIENT
Start: 2018-12-24 | End: 2018-12-24

## 2018-12-24 RX ADMIN — POTASSIUM CHLORIDE 40 MEQ: 1.5 POWDER, FOR SOLUTION ORAL at 15:09

## 2018-12-24 RX ADMIN — TRAZODONE HYDROCHLORIDE 50 MG: 50 TABLET ORAL at 20:33

## 2018-12-24 RX ADMIN — HEPARIN SODIUM 5000 UNITS: 5000 INJECTION INTRAVENOUS; SUBCUTANEOUS at 20:36

## 2018-12-24 RX ADMIN — HEPARIN SODIUM 5000 UNITS: 5000 INJECTION INTRAVENOUS; SUBCUTANEOUS at 06:57

## 2018-12-24 RX ADMIN — TOPIRAMATE 25 MG: 25 TABLET, FILM COATED ORAL at 20:32

## 2018-12-24 RX ADMIN — POTASSIUM CHLORIDE: 149 INJECTION, SOLUTION, CONCENTRATE INTRAVENOUS at 10:58

## 2018-12-24 RX ADMIN — HEPARIN SODIUM 5000 UNITS: 5000 INJECTION INTRAVENOUS; SUBCUTANEOUS at 15:09

## 2018-12-24 RX ADMIN — Medication 10 ML: at 09:13

## 2018-12-24 RX ADMIN — SODIUM BICARBONATE: 84 INJECTION, SOLUTION INTRAVENOUS at 03:05

## 2018-12-24 RX ADMIN — OLANZAPINE 2.5 MG: 2.5 TABLET, FILM COATED ORAL at 20:33

## 2018-12-24 RX ADMIN — POTASSIUM CHLORIDE 40 MEQ: 1.5 POWDER, FOR SOLUTION ORAL at 10:24

## 2018-12-24 NOTE — CARE COORDINATION
Discharge planning follow up:   Noted consult to contact Pt's Louise Celis [phone]. SW contacted and left message. Will follow. Plan for new SNF at [facility] when medically stable for d/c.     SW following. Thank You.    Electronically signed by JUSTUS Anderson Ala, LSW on 12/24/2018 at 10:10 AM   Phone: [phone]

## 2018-12-24 NOTE — PROGRESS NOTES
pain  REQUIRES PT FOLLOW UP: Yes  Activity Tolerance  Activity Tolerance: Patient limited by cognitive status; Patient limited by pain         Plan   Plan  Times per week: 3-5x/week  Specific instructions for Next Treatment: cotx  Current Treatment Recommendations: Functional Mobility Training  Safety Devices  Type of devices: Call light within reach, Chair alarm in place, Gait belt, Patient at risk for falls, Left in chair, Nurse notified Sharyl Cowden, RN aware and gr-son in the room)    AM-PAC Score  AM-PAC Inpatient Mobility Raw Score : 8  AM-PAC Inpatient T-Scale Score : 28.52  Mobility Inpatient CMS 0-100% Score: 86.62  Mobility Inpatient CMS G-Code Modifier : CM          Goals  Short term goals  Time Frame for Short term goals: upon d/c  Short term goal 1: Bed mobility with mod A of 1. Short term goal 2: Seated EOB with SBA. Short term goal 3: The pt to be awake and alert enough to follow directions for mobility. Short term goal 4: Transfer goal to be made once weightbearing restrictions are known. (met)  Short term goal 5: New goal: Pivot transfer bed <> chair with mod/max A of 1-2.   Patient Goals   Patient goals : the pt unable to give a personal therapy goal       Therapy Time   Individual Concurrent Group Co-treatment   Time In 1220         Time Out 1250         Minutes 30                 Electronically signed by Addison Nazario, PT 3200 on 12/24/2018 at 1:05 PM

## 2018-12-24 NOTE — PROGRESS NOTES
Hospitalist Progress Note      PCP: Larita Boxer, MD    Date of Admission: 12/20/2018  Hospital day - 4        Medications:  Reviewed    Infusion Medications    IV infusion builder       Scheduled Medications    potassium chloride  40 mEq Oral Q4H    aspirin  81 mg Oral Daily    carvedilol  12.5 mg Oral BID WC    citalopram  10 mg Oral Daily    ferrous sulfate  325 mg Oral Daily with breakfast    OLANZapine  2.5 mg Oral Nightly    topiramate  25 mg Oral Nightly    traZODone  50 mg Oral Nightly    sodium chloride flush  10 mL Intravenous 2 times per day    docusate sodium  100 mg Oral BID    heparin (porcine)  5,000 Units Subcutaneous 3 times per day     PRN Meds: acetaminophen, sodium chloride flush, ondansetron, HYDROcodone 5 mg - acetaminophen      Intake/Output Summary (Last 24 hours) at 12/24/18 1040  Last data filed at 12/24/18 1029   Gross per 24 hour   Intake          1502.92 ml   Output              650 ml   Net           852.92 ml         Chief Complaint: arf, diarrhea,        Subjective: 12.24- no c/o, very poor po intake, needs assitance      ROS:  A 12 point review of symptoms is unremarkable with the exception of the chief complaint . Patient specifically denies cp  . Exam performed by me:    BP (!) 147/70   Pulse 101   Temp 98.1 °F (36.7 °C) (Oral)   Resp 16   Ht 5' 1\" (1.549 m)   Wt 89 lb 4.6 oz (40.5 kg)   SpO2 95%   Breastfeeding? No   BMI 16.87 kg/m²       General appearance: comfortable, distress- noen   HEENT: Atraumatic, Pupils equal, round, and reactive to light. Extra ocular muscles intact. Neck: Supple, with full range of motion. No jugular venous distention. Respiratory:  clkear to  auscultation , accessory muscle use no, Rales/Ronchi no  Cardiovascular: Regular rate and rhythm with normal S1/S2 ,  Abdomen: Soft, non-tender, non-distended with normal bowel sounds. Musculoskelatal: No clubbing, no edema bilaterally.      Dorsalis pedal pulses +   And for clarification. I have discussed the above stated plan with the patient and Gr son Aide Valencia  and they verbalized understanding and agreed with the plan. RN notified about todays plan  bed side. Dispo: will monitor, Discharge in few  days to snf . Needs to stay in hospital for ivf . Toan Mccormick MD  [phone]

## 2018-12-24 NOTE — PROGRESS NOTES
Dependent/Total (to change depends)  Toileting: Dependent/Total (pt incontinent or urine and loose BM in bed, total A to change depends and provide hygiene rolling in bed)     Sitting Balance: Moderate Assistance (seated EOB with mod A initially d/t posterior lean, progressed to CGA0  Standing Balance: Unsafe to attempt    Bed mobility  Rolling to Left: Maximum assistance;2 Person assistance  Rolling to Right: Maximum assistance;2 Person assistance  Supine to Sit: Maximum assistance;2 Person assistance (and another to assist with lines)  Sit to Supine: Unable to assess (pt up to the chair at end of session)  Scooting: Maximal assistance;2 Person assistance (to scoot back in the chair)     Transfers  Sit Pivot Transfers: Dependent/Total (total A x2, NWB L LE)     Cognition  Overall Cognitive Status: Exceptions  Arousal/Alertness: Delayed responses to stimuli  Following Commands: Inconsistently follows commands  Attention Span: Difficulty attending to directions  Memory: Decreased recall of biographical Information;Decreased recall of recent events  Safety Judgement: Decreased awareness of need for safety;Decreased awareness of need for assistance  Problem Solving: Decreased awareness of errors;Assistance required to generate solutions;Assistance required to implement solutions;Assistance required to identify errors made;Assistance required to correct errors made  Insights: Not aware of deficits  Initiation: Requires cues for all  Sequencing: Requires cues for all     Assessment   Performance deficits / Impairments: Decreased functional mobility ; Decreased ADL status; Decreased cognition;Decreased safe awareness;Decreased endurance;Decreased balance  Assessment: Pt tolerated treatment poor, limited by cognition, pain/NWB status L LE, decreased balance/fxl mobility, decreased strength. This date, pt total A x2 bed mobility, toileting and dressing (in bed), total A x2 fxl bed-chair transfer.  Pt confused, not consistently Time Out 1250         Minutes 30               This note to serve as OT d/c summary if pt is d/c-ed prior to next therapy session.     Jerman Paul, OTR/L 1640

## 2018-12-25 VITALS
OXYGEN SATURATION: 94 % | HEIGHT: 61 IN | TEMPERATURE: 97.9 F | RESPIRATION RATE: 18 BRPM | SYSTOLIC BLOOD PRESSURE: 113 MMHG | WEIGHT: 89.29 LBS | HEART RATE: 118 BPM | DIASTOLIC BLOOD PRESSURE: 63 MMHG | BODY MASS INDEX: 16.86 KG/M2

## 2018-12-25 LAB
ALBUMIN SERPL-MCNC: 2.7 G/DL (ref 3.4–5)
ANION GAP SERPL CALCULATED.3IONS-SCNC: 15 MMOL/L (ref 3–16)
BUN BLDV-MCNC: 10 MG/DL (ref 7–20)
CALCIUM SERPL-MCNC: 7.5 MG/DL (ref 8.3–10.6)
CHLORIDE BLD-SCNC: 98 MMOL/L (ref 99–110)
CO2: 25 MMOL/L (ref 21–32)
CREAT SERPL-MCNC: 1.2 MG/DL (ref 0.6–1.2)
GFR AFRICAN AMERICAN: 52
GFR NON-AFRICAN AMERICAN: 43
GLUCOSE BLD-MCNC: 110 MG/DL (ref 70–99)
PHOSPHORUS: 2.8 MG/DL (ref 2.5–4.9)
POTASSIUM SERPL-SCNC: 4.1 MMOL/L (ref 3.5–5.1)
SODIUM BLD-SCNC: 138 MMOL/L (ref 136–145)

## 2018-12-25 PROCEDURE — 6360000002 HC RX W HCPCS: Performed by: INTERNAL MEDICINE

## 2018-12-25 PROCEDURE — 2580000003 HC RX 258: Performed by: INTERNAL MEDICINE

## 2018-12-25 PROCEDURE — 80069 RENAL FUNCTION PANEL: CPT

## 2018-12-25 PROCEDURE — 36415 COLL VENOUS BLD VENIPUNCTURE: CPT

## 2018-12-25 PROCEDURE — 2500000003 HC RX 250 WO HCPCS: Performed by: INTERNAL MEDICINE

## 2018-12-25 RX ORDER — ALPRAZOLAM 0.5 MG/1
0.5 TABLET ORAL NIGHTLY PRN
Qty: 5 TABLET | Refills: 0 | Status: SHIPPED | OUTPATIENT
Start: 2018-12-25 | End: 2018-12-30

## 2018-12-25 RX ORDER — HYDROCODONE BITARTRATE AND ACETAMINOPHEN 5; 325 MG/1; MG/1
1 TABLET ORAL DAILY PRN
Qty: 5 TABLET | Refills: 0 | Status: SHIPPED | OUTPATIENT
Start: 2018-12-25 | End: 2018-12-30

## 2018-12-25 RX ORDER — TRAZODONE HYDROCHLORIDE 50 MG/1
50 TABLET ORAL NIGHTLY
Qty: 30 TABLET | Refills: 1 | Status: SHIPPED | OUTPATIENT
Start: 2018-12-25 | End: 2018-12-25

## 2018-12-25 RX ORDER — METOPROLOL SUCCINATE 25 MG/1
12.5 TABLET, EXTENDED RELEASE ORAL DAILY
Qty: 90 TABLET | Refills: 1 | Status: SHIPPED | OUTPATIENT
Start: 2018-12-25 | End: 2018-12-25

## 2018-12-25 RX ORDER — CARVEDILOL 3.12 MG/1
3.12 TABLET ORAL 2 TIMES DAILY WITH MEALS
Qty: 60 TABLET | Refills: 3 | Status: CANCELLED | OUTPATIENT
Start: 2018-12-25

## 2018-12-25 RX ORDER — METOPROLOL SUCCINATE 25 MG/1
12.5 TABLET, EXTENDED RELEASE ORAL DAILY
Qty: 90 TABLET | Refills: 1 | Status: SHIPPED | OUTPATIENT
Start: 2018-12-25

## 2018-12-25 RX ORDER — TRAZODONE HYDROCHLORIDE 50 MG/1
50 TABLET ORAL NIGHTLY
Qty: 30 TABLET | Refills: 1 | Status: SHIPPED | OUTPATIENT
Start: 2018-12-25

## 2018-12-25 RX ORDER — HYDROCODONE BITARTRATE AND ACETAMINOPHEN 5; 325 MG/1; MG/1
1 TABLET ORAL DAILY PRN
Qty: 5 TABLET | Refills: 0 | Status: SHIPPED | OUTPATIENT
Start: 2018-12-25 | End: 2018-12-25

## 2018-12-25 RX ORDER — ALPRAZOLAM 0.5 MG/1
0.5 TABLET ORAL NIGHTLY PRN
Qty: 5 TABLET | Refills: 0 | Status: SHIPPED | OUTPATIENT
Start: 2018-12-25 | End: 2018-12-25

## 2018-12-25 RX ADMIN — HEPARIN SODIUM 5000 UNITS: 5000 INJECTION INTRAVENOUS; SUBCUTANEOUS at 04:58

## 2018-12-25 RX ADMIN — POTASSIUM CHLORIDE: 149 INJECTION, SOLUTION, CONCENTRATE INTRAVENOUS at 06:36

## 2018-12-25 NOTE — DISCHARGE SUMMARY
errors. If there are any questions or concerns please feel free to contact the dictating provider for clarification. Signed:    Toan Toussaint MD   12/25/2018      Thank you Whitney Cruz MD for the opportunity to be involved in this patient's care. If you have any questions or concerns please feel free to contact me at [phone].

## 2018-12-25 NOTE — PROGRESS NOTES
Pt to [facility] with all belongings. Out via stretcher.

## 2018-12-25 NOTE — PROGRESS NOTES
Nephrology progress  Note      History of Present Ilness:    Juan Ramon Alfonso is a 80 y.o. female with h/o dementia, HTN who was recently discharged form hospital. Was admitted previous admission for UTI. Had klebsiella for which she was treated with ciprofloxacin. Now presented with weakness and diarrhea. She is awake and alert. Creatinine level is elevated at 2.2. 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   Joanie Miranda HIP SURGERY      right    HYSTERECTOMY      KNEE SURGERY      bilateral         Current Medications:      carvedilol (COREG) tablet 3.125 mg BID WC   acetaminophen (TYLENOL) suppository 650 mg Q4H PRN   aspirin chewable tablet 81 mg Daily   citalopram (CELEXA) tablet 10 mg Daily   ferrous sulfate EC tablet 325 mg Daily with breakfast   OLANZapine (ZYPREXA) tablet 2.5 mg Nightly   topiramate (TOPAMAX) tablet 25 mg Nightly   traZODone (DESYREL) tablet 50 mg Nightly   sodium chloride flush 0.9 % injection 10 mL 2 times per day   sodium chloride flush 0.9 % injection 10 mL PRN   docusate sodium (COLACE) capsule 100 mg BID   ondansetron (ZOFRAN) injection 4 mg Q6H PRN   heparin (porcine) injection 5,000 Units 3 times per day   HYDROcodone-acetaminophen (NORCO) 5-325 MG per tablet 1 tablet Q6H PRN           Physical exam:     Vitals:  /69   Pulse 106   Temp 98.3 °F (36.8 °C) (Axillary)   Resp 16   Ht 5' 1\" (1.549 m)   Wt 89 lb 4.6 oz (40.5 kg)   SpO2 94%   Breastfeeding?  No   BMI 16.87 kg/m²   Constitutional:  AA OX 2  Skin: no rash, turgor wnl  Heent:  eomi, mmm  Neck: no bruits or jvd noted  Cardiovascular:  S1, S2 without m/r/g  Respiratory: CTA B without w/r/r  Abdomen:  +bs, soft, nt, nd  Ext: no lower extremity edema      Labs:  CBC:   Recent Labs      12/23/18   0515  12/24/18   0624   WBC  11.4*  13.0*   HGB  9.4*  10.2*   PLT  572*  518*     BMP:    Recent Labs      12/23/18   0515  12/24/18   0623  12/25/18   0632   NA  148*  136  138   K  3.8  3.0*  4.1   CL  115*  96*  98*   CO2  14*  23  25   BUN  17  11  10   CREATININE  2.1*  1.4*  1.2   GLUCOSE  92  118*  110*     Ca/Mg/Phos:   Recent Labs      12/23/18   0515  12/24/18   0623  12/25/18   0598   CALCIUM  8.6  7.7*  7.5*   PHOS  3.9  2.8  2.8     Hepatic:   No results for input(s): AST, ALT, ALB, BILITOT, ALKPHOS in the last 72 hours. Troponin:   No results for input(s): TROPONINI in the last 72 hours. BNP: No results for input(s): BNP in the last 72 hours. Lipids: No results for input(s): CHOL, TRIG, HDL, LDLCALC, LABVLDL in the last 72 hours. ABGs: No results for input(s): PHART, PO2ART, GGU3QJI in the last 72 hours. INR: No results for input(s): INR in the last 72 hours. UA:  Recent Labs      12/24/18   0307   COLORU  YELLOW   CLARITYU  Clear   GLUCOSEU  Negative   BILIRUBINUR  Negative   KETUA  Negative   SPECGRAV  1.007   BLOODU  Negative   PHUR  7.5   PROTEINU  Negative   UROBILINOGEN  0.2   NITRU  Negative   LEUKOCYTESUR  SMALL*   LABMICR  YES   URINETYPE  Not Specified      Urine Microscopic:   Recent Labs      12/24/18   0307   HYALCAST  1   WBCUA  10*   RBCUA  1   EPIU  1     Urine Culture: No results for input(s): LABURIN in the last 72 hours. Urine Chemistry:   No results for input(s): Jacquetta Signs, PROTEINUR, NAUR in the last 72 hours. IMAGING:  XR CHEST PORTABLE   Final Result   No acute cardiopulmonary process. XR KNEE LEFT (1-2 VIEWS)   Final Result   1. Acute fracture of the distal femur. XR CHEST PORTABLE   Final Result   No acute cardiopulmonary disease. I/O last 3 completed shifts: In: 120 [P.O.:120]  Out: -           Assessment/Plan :      1.   EMELY - improving slowly   likely ATN from hemodynamic changes . Reviewed urine studies  UTI. Recommend to dose adjust all medications  based on renal functions  AVOID NSAIDs  Avoid Nephrotoxins  Monitor I/O  2. Hypotension. Resolved   3. Diarrhea. Stool for c diff ordered   4. Acid- base disorder. HCo3 lmproved. DC iVF  5. Hypernatremia. Resolved.    6. Hypokalemia - corrected               Thank you for allowing us to participate in care of Deanna Robles         Electronically signed by: Titi Rajan MD, 12/25/2018, 11:50 AM      [facility] [address] [place] [address] S  Office : [phone]  Fax :[phone]

## 2018-12-26 LAB
EKG ATRIAL RATE: 106 BPM
EKG DIAGNOSIS: NORMAL
EKG P AXIS: 65 DEGREES
EKG P-R INTERVAL: 140 MS
EKG Q-T INTERVAL: 376 MS
EKG QRS DURATION: 122 MS
EKG QTC CALCULATION (BAZETT): 499 MS
EKG R AXIS: -71 DEGREES
EKG T AXIS: 108 DEGREES
EKG VENTRICULAR RATE: 106 BPM

## 2019-01-09 ENCOUNTER — CARE COORDINATION (OUTPATIENT)
Dept: CASE MANAGEMENT | Age: 84
End: 2019-01-09

## 2023-08-19 NOTE — DISCHARGE INSTR - COC
Continuity of Care Form    Patient Name: Barbara Barone   :  1935  MRN:  [id number]    Admit date:  2018  Discharge date:  2018      Code Status Order: Full Code   Advance Directives:     Admitting Physician:  Britni Romo MD  PCP: Rocio Cuevas MD    Discharging Nurse: [facility] Unit/Room#: [id number]/8269-72  Discharging Unit Phone Number: ***    Emergency Contact:   Extended Emergency Contact Information  Primary Emergency Contact: Tim Ko [place]  Home Phone: [phone]  Relation: Child  Secondary Emergency Contact: [address] [facility] Phone: [phone]  Work Phone: [phone]  Relation: Child    Past Surgical History:  Past Surgical History:   Procedure Laterality Date    CARDIAC SURGERY      COLON SURGERY      colon resection d/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   Teresita Ascencio HIP SURGERY      right    HYSTERECTOMY      KNEE SURGERY      bilateral       Immunization History:   Immunization History   Administered Date(s) Administered    Influenza Vaccine, unspecified formulation 10/18/2016    Pneumococcal Vaccine, unspecified formulation 10/18/2016       Active Problems:  Patient Active Problem List   Diagnosis Code    Chest pain R07.9    Essential hypertension I10    Closed fracture of left hip ([address] S72.002A    Dementia F03.90    Closed subcapital fracture of neck of left femur ([address] S72.012A    Syncope and collapse R55    CHF (congestive heart failure), NYHA class I, acute on chronic, combined ([address] I50.43    Psychosis ([facility] F29    Acute cystitis with hematuria N30.01    Generalized weakness R53.1    Acute metabolic encephalopathy N04.22    Severe malnutrition ([address] E43       Isolation/Infection:   Isolation          No Isolation            Nurse Assessment:  Last Vital Signs: /67   Pulse 91   Temp 97.4 °F (36.3 °C) (Axillary)   Resp 18   Ht 5' (1.524 m)   Wt 92 lb 2.4 oz (41.8 kg)   SpO2 95%   BMI 18.00 kg/m²     Last documented pain
pcp